# Patient Record
Sex: MALE | Race: BLACK OR AFRICAN AMERICAN | NOT HISPANIC OR LATINO | Employment: UNEMPLOYED | URBAN - METROPOLITAN AREA
[De-identification: names, ages, dates, MRNs, and addresses within clinical notes are randomized per-mention and may not be internally consistent; named-entity substitution may affect disease eponyms.]

---

## 2018-02-20 ENCOUNTER — HOSPITAL ENCOUNTER (OUTPATIENT)
Dept: GENERAL RADIOLOGY | Facility: CLINIC | Age: 68
Discharge: HOME | End: 2018-02-20
Attending: FAMILY MEDICINE | Admitting: FAMILY MEDICINE
Payer: MEDICAID

## 2018-02-20 DIAGNOSIS — R52 PAIN: ICD-10-CM

## 2018-02-20 PROCEDURE — 73110 X-RAY EXAM OF WRIST: CPT | Mod: LT

## 2021-02-22 ENCOUNTER — HOSPITAL ENCOUNTER (OUTPATIENT)
Dept: MRI IMAGING | Facility: CLINIC | Age: 71
End: 2021-02-22
Attending: INTERNAL MEDICINE
Payer: MEDICARE

## 2021-02-22 DIAGNOSIS — M25.562 ACUTE PAIN OF LEFT KNEE: ICD-10-CM

## 2021-02-22 DIAGNOSIS — M54.32 SCIATICA OF LEFT SIDE: ICD-10-CM

## 2021-02-22 PROCEDURE — 73721 MRI JNT OF LWR EXTRE W/O DYE: CPT | Mod: LT

## 2021-02-22 PROCEDURE — 73721 MRI JNT OF LWR EXTRE W/O DYE: CPT | Mod: LT,XS

## 2021-02-22 PROCEDURE — 73721 MRI JNT OF LWR EXTRE W/O DYE: CPT | Mod: 26 | Performed by: RADIOLOGY

## 2021-02-22 PROCEDURE — 72148 MRI LUMBAR SPINE W/O DYE: CPT | Mod: 26 | Performed by: RADIOLOGY

## 2021-02-22 PROCEDURE — 72148 MRI LUMBAR SPINE W/O DYE: CPT

## 2021-03-05 ENCOUNTER — TELEPHONE (OUTPATIENT)
Dept: NEUROSURGERY | Facility: CLINIC | Age: 71
End: 2021-03-05

## 2021-03-05 NOTE — TELEPHONE ENCOUNTER
Indira calling stating she was talking to a male scheduling an appointment for patient and got disconnected, Indira states it was for 4/7/21 but does not know the provider, no appointment has been made. Please call to discuss thank you.

## 2021-03-08 ENCOUNTER — APPOINTMENT (OUTPATIENT)
Dept: INTERPRETER SERVICES | Facility: CLINIC | Age: 71
End: 2021-03-08
Payer: MEDICARE

## 2021-03-08 NOTE — TELEPHONE ENCOUNTER
Writer spoke with pt who said that he has scheduled an appt in [facility] and no longer is needing to schedule with us.     Ashli Merrill

## 2021-04-07 ENCOUNTER — TELEPHONE (OUTPATIENT)
Dept: NEUROSURGERY | Facility: CLINIC | Age: 71
End: 2021-04-07

## 2021-04-07 NOTE — TELEPHONE ENCOUNTER
[facility] Call Center    Phone Message    May a detailed message be left on voicemail: yes     Reason for Call: Other: Patient needs to set up appointment with Neurosurgery. Primary care clinic called to state that patient made a mistake in stating appointment was not needed. please advise      Action Taken: Other: [facility]    Travel Screening: Not Applicable

## 2021-09-30 ENCOUNTER — LAB REQUISITION (OUTPATIENT)
Dept: LAB | Facility: CLINIC | Age: 71
End: 2021-09-30
Payer: MEDICARE

## 2021-09-30 DIAGNOSIS — R39.15 URGENCY OF URINATION: ICD-10-CM

## 2021-09-30 PROCEDURE — 87491 CHLMYD TRACH DNA AMP PROBE: CPT | Mod: ORL | Performed by: INTERNAL MEDICINE

## 2021-09-30 PROCEDURE — 87591 N.GONORRHOEAE DNA AMP PROB: CPT | Mod: ORL | Performed by: INTERNAL MEDICINE

## 2021-10-01 LAB
C TRACH DNA SPEC QL NAA+PROBE: NEGATIVE
N GONORRHOEA DNA SPEC QL NAA+PROBE: NEGATIVE

## 2021-11-08 ENCOUNTER — LAB REQUISITION (OUTPATIENT)
Dept: LAB | Facility: CLINIC | Age: 71
End: 2021-11-08
Payer: MEDICARE

## 2021-11-08 DIAGNOSIS — I10 ESSENTIAL (PRIMARY) HYPERTENSION: ICD-10-CM

## 2021-11-08 LAB
ANION GAP SERPL CALCULATED.3IONS-SCNC: 8 MMOL/L (ref 3–14)
BUN SERPL-MCNC: 17 MG/DL (ref 7–30)
CALCIUM SERPL-MCNC: 8.8 MG/DL (ref 8.5–10.1)
CHLORIDE BLD-SCNC: 104 MMOL/L (ref 94–109)
CO2 SERPL-SCNC: 24 MMOL/L (ref 20–32)
CREAT SERPL-MCNC: 1.03 MG/DL (ref 0.66–1.25)
GFR SERPL CREATININE-BSD FRML MDRD: 74 ML/MIN/1.73M2
GLUCOSE BLD-MCNC: 90 MG/DL (ref 70–99)
POTASSIUM BLD-SCNC: 3.6 MMOL/L (ref 3.4–5.3)
SODIUM SERPL-SCNC: 136 MMOL/L (ref 133–144)

## 2021-11-08 PROCEDURE — 80048 BASIC METABOLIC PNL TOTAL CA: CPT | Mod: ORL | Performed by: INTERNAL MEDICINE

## 2022-06-23 ENCOUNTER — TRANSCRIBE ORDERS (OUTPATIENT)
Dept: OTHER | Age: 72
End: 2022-06-23

## 2022-06-23 DIAGNOSIS — M17.0 OSTEOARTHRITIS OF BOTH KNEES, UNSPECIFIED OSTEOARTHRITIS TYPE: Primary | ICD-10-CM

## 2022-07-19 ENCOUNTER — TRANSCRIBE ORDERS (OUTPATIENT)
Dept: OTHER | Age: 72
End: 2022-07-19

## 2022-07-19 DIAGNOSIS — M54.32 SCIATICA OF LEFT SIDE: Primary | ICD-10-CM

## 2022-08-05 ENCOUNTER — THERAPY VISIT (OUTPATIENT)
Dept: PHYSICAL THERAPY | Facility: CLINIC | Age: 72
End: 2022-08-05
Attending: INTERNAL MEDICINE
Payer: MEDICARE

## 2022-08-05 DIAGNOSIS — M54.42 CHRONIC BILATERAL LOW BACK PAIN WITH BILATERAL SCIATICA: ICD-10-CM

## 2022-08-05 DIAGNOSIS — G89.29 CHRONIC BILATERAL LOW BACK PAIN WITH BILATERAL SCIATICA: ICD-10-CM

## 2022-08-05 DIAGNOSIS — M54.41 CHRONIC BILATERAL LOW BACK PAIN WITH BILATERAL SCIATICA: ICD-10-CM

## 2022-08-05 PROCEDURE — 97110 THERAPEUTIC EXERCISES: CPT | Mod: GP | Performed by: PHYSICAL THERAPIST

## 2022-08-05 PROCEDURE — 97161 PT EVAL LOW COMPLEX 20 MIN: CPT | Mod: GP | Performed by: PHYSICAL THERAPIST

## 2022-08-05 NOTE — PROGRESS NOTES
[facility]    OUTPATIENT Physical Therapy ORTHOPEDIC EVALUATION  PLAN OF TREATMENT FOR OUTPATIENT REHABILITATION  (COMPLETE FOR INITIAL CLAIMS ONLY)  Patient's Last Name, First Name, M.I.  YOB: 1950  Jossue Hickey  DOC    Provider s Name:  [facility]   Medical Record No.  [id number]   Start of Care Date:  08/05/22   Onset Date:       Type:     _X__PT   ___OT Medical Diagnosis:    Encounter Diagnosis   Name Primary?    Chronic bilateral low back pain with bilateral sciatica         Treatment Diagnosis:  low back and leg pain        Goals:     08/05/22 0500   Body Part   Goals listed below are for Back pain   Goal #1   Goal #1 lifting/carrying   Previous Functional Level No restrictions   Current Functional Level an item to counter height weighing   Performance level 5#, pain   STG Target Performance Lift an item to counter height weighing   Performance level 10#, pain 2/10 or less   Rationale for housework such as laundry, emptying garbage, use of    Due date 09/02/22   LTG Target Performance Lift an item to counter height weighing   Performance Level 10#, no pain   Rationale for housework such as laundry, emptying garbage, use of    Due date 09/30/22       Therapy Frequency:  1x/week  Predicted Duration of Therapy Intervention:  6-8 weeks    Florinda Thorpe, PT                 I CERTIFY THE NEED FOR THESE SERVICES FURNISHED UNDER        THIS PLAN OF TREATMENT AND WHILE UNDER MY CARE     (Physician attestation of this document indicates review and certification of the therapy plan).                     Certification Date From:  08/05/22   Certification Date To:  11/02/22    Referring Provider:  Taylor Willis    Initial Assessment        See Epic Evaluation SOC Date: 08/05/22

## 2022-08-05 NOTE — PROGRESS NOTES
Physical Therapy Initial Evaluation  Subjective:  Patient presents to outpatient PT with bilateral low back pain that can radiate down both legs (left side worse than right).  Symptoms have been going on for two months, and are not changing overall.  Sleep has been significantly disrupted, as well as prolonged sitting and walking.  Patient is currently using a cane, was not using one previously.    The history is provided by the patient. A  was used (in person, Cameroonian).   Therapist Generated HPI Evaluation         Type of problem:  Lumbar.    This is a chronic condition.  Condition occurred with:  Insidious onset.  Where condition occurred: for unknown reasons.  Patient reports pain:  Lumbar spine left and lumbar spine right.  and is constant.  Pain radiates to:  Thigh left, thigh right, lower leg left, lower leg right and foot left. Pain is the same all the time.  Since onset symptoms are unchanged.  Symptoms are exacerbated by walking, lifting and lying down  and relieved by nothing.          Patient Health History  Jossue Hickey being seen for low back pain with leg pain.       Problem occurred: no known cause          Red flags:  None as reported by patient.         Current medications:  None.                                           Objective:  System         Lumbar/SI Evaluation    Lumbar Myotomes:    T12-L3 (Hip Flex):  Left: 5    Right: 5  L2-4 (Quads):  Left:  5    Right:  5  L4 (Ankle DF):  Left:  5    Right:  5            Neural Tension/Mobility:    Left side:  Slump positive.     Right side:   Slump  negative.   Lumbar Palpation:  Palpation (lumbar): tenderness to palpation at mid to lower lumbar segments.  Tenderness present at Left:    Erector Spinae    Tenderness not present at Right:  Erector Spinae                                                       Shannon Lumbar Evaluation    Posture:  Sitting: fair        Correction of Posture: better    Movement Loss:  Flexion (Flex):  pain and mod  Extension (EXT): min and pain  Side [place] R (SG R): min  Side Glide L (SG L): min and pain  Test Movements:    EIS:   Repeat EIS: During: centralizing  After: no effect  Mechanical Response: IncROM          Static Tests:          Lying Prone in Extension: prone on elbows, centralized to low back, no better after    Conclusion: derangement  Principle of Treatment:      Extension: trial extension, monitor symptom response                                           ROS    Assessment/Plan:    Patient is a 72 year old male with lumbar complaints.    Patient has the following significant findings with corresponding treatment plan.                Diagnosis 1:  Bilateral low back and leg pain  Pain -  manual therapy, self management, education, directional preference exercise and home program  Decreased ROM/flexibility - manual therapy, therapeutic exercise and home program  Decreased strength - therapeutic exercise, therapeutic activities and home program  Impaired gait - gait training and home program  Impaired posture - neuro re-education and home program    Therapy Evaluation Codes:   1) History comprised of:   Personal factors that impact the plan of care:      None.    Comorbidity factors that impact the plan of care are:      None.     Medications impacting care: None.  2) Examination of Body Systems comprised of:   Body structures and functions that impact the plan of care:      Lumbar spine.   Activity limitations that impact the plan of care are:      Bending, Lifting, Standing, Walking and Sleeping.  3) Clinical presentation characteristics are:   Stable/Uncomplicated.  4) Decision-Making    Low complexity using standardized patient assessment instrument and/or measureable assessment of functional outcome.  Cumulative Therapy Evaluation is: Low complexity.    Previous and current functional limitations:  (See Goal Flow Sheet for this information)    Short term and Long term goals: (See Goal Flow  Sheet for this information)     Communication ability:  Patient appears to be able to clearly communicate and understand verbal and written communication and follow directions correctly.  Treatment Explanation - The following has been discussed with the patient:   RX ordered/plan of care  Anticipated outcomes  Possible risks and side effects  This patient would benefit from PT intervention to resume normal activities.   Rehab potential is good.    Frequency:  1 X week, once daily  Duration:  for 6-8 weeks  Discharge Plan:  Achieve all LTG.  Independent in home treatment program.  Reach maximal therapeutic benefit.    Please refer to the daily flowsheet for treatment today, total treatment time and time spent performing 1:1 timed codes.

## 2022-09-02 ENCOUNTER — ANCILLARY PROCEDURE (OUTPATIENT)
Dept: ULTRASOUND IMAGING | Facility: CLINIC | Age: 72
End: 2022-09-02
Attending: INTERNAL MEDICINE
Payer: MEDICARE

## 2022-09-02 DIAGNOSIS — N50.89 SWELLING OF RIGHT HALF OF SCROTUM: ICD-10-CM

## 2022-09-02 PROCEDURE — 93976 VASCULAR STUDY: CPT | Mod: GC | Performed by: RADIOLOGY

## 2022-09-02 PROCEDURE — 76870 US EXAM SCROTUM: CPT | Mod: GC | Performed by: RADIOLOGY

## 2022-09-22 ENCOUNTER — LAB REQUISITION (OUTPATIENT)
Dept: LAB | Facility: CLINIC | Age: 72
End: 2022-09-22
Payer: MEDICARE

## 2022-09-22 DIAGNOSIS — Z13.220 ENCOUNTER FOR SCREENING FOR LIPOID DISORDERS: ICD-10-CM

## 2022-09-22 DIAGNOSIS — R25.2 CRAMP AND SPASM: ICD-10-CM

## 2022-09-22 PROCEDURE — 80061 LIPID PANEL: CPT | Mod: ORL | Performed by: INTERNAL MEDICINE

## 2022-09-22 PROCEDURE — 83735 ASSAY OF MAGNESIUM: CPT | Mod: ORL | Performed by: INTERNAL MEDICINE

## 2022-09-22 PROCEDURE — 80048 BASIC METABOLIC PNL TOTAL CA: CPT | Mod: ORL | Performed by: INTERNAL MEDICINE

## 2022-09-23 LAB
ANION GAP SERPL CALCULATED.3IONS-SCNC: 15 MMOL/L (ref 7–15)
BUN SERPL-MCNC: 16.7 MG/DL (ref 8–23)
CALCIUM SERPL-MCNC: 9.4 MG/DL (ref 8.8–10.2)
CHLORIDE SERPL-SCNC: 98 MMOL/L (ref 98–107)
CHOLEST SERPL-MCNC: 145 MG/DL
CREAT SERPL-MCNC: 1.29 MG/DL (ref 0.67–1.17)
DEPRECATED HCO3 PLAS-SCNC: 24 MMOL/L (ref 22–29)
GFR SERPL CREATININE-BSD FRML MDRD: 60 ML/MIN/1.73M2
GLUCOSE SERPL-MCNC: 89 MG/DL (ref 70–99)
HDLC SERPL-MCNC: 55 MG/DL
LDLC SERPL CALC-MCNC: 59 MG/DL
MAGNESIUM SERPL-MCNC: 2.2 MG/DL (ref 1.7–2.3)
NONHDLC SERPL-MCNC: 90 MG/DL
POTASSIUM SERPL-SCNC: 3.6 MMOL/L (ref 3.4–5.3)
SODIUM SERPL-SCNC: 137 MMOL/L (ref 136–145)
TRIGL SERPL-MCNC: 155 MG/DL

## 2022-10-31 ENCOUNTER — TRANSCRIBE ORDERS (OUTPATIENT)
Dept: OTHER | Age: 72
End: 2022-10-31

## 2022-10-31 DIAGNOSIS — H91.93 BILATERAL HEARING LOSS, UNSPECIFIED HEARING LOSS TYPE: Primary | ICD-10-CM

## 2022-12-05 ENCOUNTER — MEDICAL CORRESPONDENCE (OUTPATIENT)
Dept: HEALTH INFORMATION MANAGEMENT | Facility: CLINIC | Age: 72
End: 2022-12-05

## 2022-12-05 ENCOUNTER — LAB REQUISITION (OUTPATIENT)
Dept: LAB | Facility: CLINIC | Age: 72
End: 2022-12-05
Payer: MEDICARE

## 2022-12-05 DIAGNOSIS — R79.89 OTHER SPECIFIED ABNORMAL FINDINGS OF BLOOD CHEMISTRY: ICD-10-CM

## 2022-12-05 LAB
ALBUMIN SERPL BCG-MCNC: 4.4 G/DL (ref 3.5–5.2)
ANION GAP SERPL CALCULATED.3IONS-SCNC: 16 MMOL/L (ref 7–15)
BUN SERPL-MCNC: 16.8 MG/DL (ref 8–23)
CALCIUM SERPL-MCNC: 9.1 MG/DL (ref 8.8–10.2)
CHLORIDE SERPL-SCNC: 97 MMOL/L (ref 98–107)
CREAT SERPL-MCNC: 0.99 MG/DL (ref 0.67–1.17)
DEPRECATED HCO3 PLAS-SCNC: 24 MMOL/L (ref 22–29)
GFR SERPL CREATININE-BSD FRML MDRD: 82 ML/MIN/1.73M2
GLUCOSE SERPL-MCNC: 93 MG/DL (ref 70–99)
PHOSPHATE SERPL-MCNC: 2.8 MG/DL (ref 2.5–4.5)
POTASSIUM SERPL-SCNC: 3 MMOL/L (ref 3.4–5.3)
SODIUM SERPL-SCNC: 137 MMOL/L (ref 136–145)

## 2022-12-05 PROCEDURE — 80069 RENAL FUNCTION PANEL: CPT | Mod: ORL | Performed by: INTERNAL MEDICINE

## 2022-12-06 ENCOUNTER — LAB REQUISITION (OUTPATIENT)
Dept: LAB | Facility: CLINIC | Age: 72
End: 2022-12-06
Payer: MEDICARE

## 2022-12-06 DIAGNOSIS — K21.9 GASTRO-ESOPHAGEAL REFLUX DISEASE WITHOUT ESOPHAGITIS: ICD-10-CM

## 2022-12-06 PROCEDURE — 87338 HPYLORI STOOL AG IA: CPT | Mod: ORL | Performed by: INTERNAL MEDICINE

## 2022-12-07 ENCOUNTER — TRANSCRIBE ORDERS (OUTPATIENT)
Dept: OTHER | Age: 72
End: 2022-12-07

## 2022-12-07 DIAGNOSIS — N50.811 TESTICULAR PAIN, RIGHT: Primary | ICD-10-CM

## 2022-12-07 LAB — H PYLORI AG STL QL IA: POSITIVE

## 2022-12-12 ENCOUNTER — OFFICE VISIT (OUTPATIENT)
Dept: UROLOGY | Facility: CLINIC | Age: 72
End: 2022-12-12
Attending: INTERNAL MEDICINE
Payer: MEDICARE

## 2022-12-12 VITALS — HEART RATE: 86 BPM | OXYGEN SATURATION: 98 % | SYSTOLIC BLOOD PRESSURE: 117 MMHG | DIASTOLIC BLOOD PRESSURE: 64 MMHG

## 2022-12-12 DIAGNOSIS — N50.811 TESTICULAR PAIN, RIGHT: ICD-10-CM

## 2022-12-12 PROCEDURE — 99204 OFFICE O/P NEW MOD 45 MIN: CPT | Performed by: UROLOGY

## 2022-12-12 RX ORDER — GABAPENTIN 100 MG/1
200 CAPSULE ORAL
COMMUNITY
Start: 2022-09-22

## 2022-12-12 RX ORDER — PSEUDOEPHED/ACETAMINOPH/DIPHEN 30MG-500MG
TABLET ORAL
COMMUNITY
Start: 2022-08-15

## 2022-12-12 RX ORDER — ALLOPURINOL 100 MG/1
TABLET ORAL
COMMUNITY
Start: 2022-12-09

## 2022-12-12 RX ORDER — MULTIPLE VITAMINS W/ MINERALS TAB 9MG-400MCG
1 TAB ORAL DAILY
COMMUNITY
Start: 2022-12-05

## 2022-12-12 RX ORDER — LORATADINE 10 MG/1
10 TABLET ORAL
COMMUNITY
Start: 2022-01-10

## 2022-12-12 RX ORDER — HYDROCHLOROTHIAZIDE 25 MG/1
TABLET ORAL
COMMUNITY
Start: 2022-09-22

## 2022-12-12 RX ORDER — ATORVASTATIN CALCIUM 80 MG/1
80 TABLET, FILM COATED ORAL DAILY
COMMUNITY
Start: 2022-09-23

## 2022-12-12 RX ORDER — FAMOTIDINE 20 MG/1
20 TABLET, FILM COATED ORAL
COMMUNITY
Start: 2022-12-05

## 2022-12-12 NOTE — PATIENT INSTRUCTIONS
Please call [facility] @ [phone] to schedule a scrotal ultrasound.   We will follow up with you to discuss the results.     If needed, contact your insurance company to make sure the scan is covered under your insurance plan.

## 2022-12-12 NOTE — PROGRESS NOTES
S: Patient is a 72-year-old non-English speaking [employer] [employer] man who was requested to be seen by Dr. Willis for a consultation with regard to patient's right chronic testicular pain.  Patient had injury to his testicle when he was 13 years old.  Since then patient complains of intermittent problems with right testicular pain.  Lately pain has been more constant with more irritation.  He has no obvious aggravating or relieving factors.  He has been taking antibiotics intermittently.  Patient had a scrotal ultrasound on September of this year that showed possible right epididymitis along with small reactive hydrocele.  Information was obtained through [facility] .  Current Outpatient Medications   Medication Sig Dispense Refill     diclofenac (VOLTAREN) 1 % topical gel Apply 2 g topically       famotidine (PEPCID) 20 MG tablet Take 20 mg by mouth       gabapentin (NEURONTIN) 100 MG capsule Take 200 mg by mouth       loratadine (CLARITIN) 10 MG tablet Take 10 mg by mouth       multivitamin w/minerals (MULTI-VITAMIN) tablet Take 1 tablet by mouth daily       ACETAMINOPHEN EXTRA STRENGTH 500 MG tablet TAKE 2 TABLETS BY MOUTH EVERY 6 HOURS AS NEEDED FOR PAIN.       allopurinol (ZYLOPRIM) 100 MG tablet        atorvastatin (LIPITOR) 80 MG tablet Take 80 mg by mouth daily       hydrochlorothiazide (HYDRODIURIL) 25 MG tablet TAKE 1 TABLET BY MOUTH ONCE DAILY FOR HIGH BLOOD PRESSURE       omeprazole (PRILOSEC) 20 MG DR capsule Take 20 mg by mouth daily       No Known Allergies  No past medical history on file.  No past surgical history on file.   No family history on file.  Social History     Socioeconomic History     Marital status: Single   Tobacco Use     Smoking status: Never       REVIEW OF SYSTEMS  =================  C: NEGATIVE for fever, chills, change in weight  I: NEGATIVE for worrisome rashes, moles or lesions  E/M: NEGATIVE for ear, mouth and throat problems  R: NEGATIVE for significant cough or SHORTNESS OF  BREATH  CV:  NEGATIVE for chest pain, palpitations or peripheral edema  GI: NEGATIVE for nausea, abdominal pain, heartburn, or change in bowel habits  NEURO: NEGATIVE numbness/weakness  : see HPI  PSYCH: NEGATIVE depression/anxiety  LYmph: no new enlarged lymph nodes  Ortho: no new trauma/movements      Physical Exam:  /64 (BP Location: Left arm, Patient Position: Chair, Cuff Size: Adult Large)   Pulse 86   SpO2 98%    Patient is pleasant, in no acute distress, good general condition.  Heart:  negative, PMI normal  Lung: no evidence of respiratory distress    Abdomen: Soft, nondistended, non tender. No masses. No rebound or guarding.   Exam: Penis no discharge.  Testes no masses.  Right epididymis feels firmer than right but nontender.  No scrotal edema or cellulitis.  Skin: Warm and dry.  No redness.  Neuro: grossly normal  Musculaskeletal: moving all extremities  Psych normal mood and affect  Musculoskeletal  moving all extremities  Hematologic/Lymphatic/Immunologic: normal ant/post cervical, axillary, supraclavicular and inguinal nodes    Assessment/Plan:     72-year-old Somalian male with chronic right testicular pain.  It is somewhat difficult getting history due to language barrier.  I will schedule patient for repeat scrotal sono for further evaluation.

## 2022-12-13 ENCOUNTER — HOSPITAL ENCOUNTER (OUTPATIENT)
Dept: ULTRASOUND IMAGING | Facility: CLINIC | Age: 72
Discharge: HOME | End: 2022-12-13
Attending: UROLOGY | Admitting: UROLOGY
Payer: MEDICARE

## 2022-12-13 DIAGNOSIS — N50.811 TESTICULAR PAIN, RIGHT: ICD-10-CM

## 2022-12-13 PROCEDURE — 76870 US EXAM SCROTUM: CPT

## 2022-12-13 PROCEDURE — 76870 US EXAM SCROTUM: CPT | Mod: 26 | Performed by: RADIOLOGY

## 2022-12-13 PROCEDURE — 93976 VASCULAR STUDY: CPT | Mod: 26 | Performed by: RADIOLOGY

## 2022-12-15 ENCOUNTER — LAB REQUISITION (OUTPATIENT)
Dept: LAB | Facility: CLINIC | Age: 72
End: 2022-12-15
Payer: MEDICARE

## 2022-12-15 DIAGNOSIS — Z12.11 ENCOUNTER FOR SCREENING FOR MALIGNANT NEOPLASM OF COLON: ICD-10-CM

## 2022-12-15 LAB — HEMOCCULT STL QL IA: NEGATIVE

## 2022-12-15 PROCEDURE — 82274 ASSAY TEST FOR BLOOD FECAL: CPT | Mod: ORL | Performed by: INTERNAL MEDICINE

## 2023-01-23 ENCOUNTER — TRANSCRIBE ORDERS (OUTPATIENT)
Dept: OTHER | Age: 73
End: 2023-01-23

## 2023-01-23 DIAGNOSIS — H53.8 BLURRED VISION: Primary | ICD-10-CM

## 2023-01-23 NOTE — PROGRESS NOTES
HPI:  Patient is referred for blurry vision evaluation. Patient complains of difficulty reading in both eyes.     Phone       Pertinent Medical History:    Low back pain    Hypertension    Hyperlipidemia    Headache    Hearing loss, bilateral    Osteoarthritis, both knees    Ocular History:    None    Eye Medications:    None    Assessment and Plan:  1.   Cataracts, both eyes.     Macular OCT 01/30/2023: Both eyes: normal    Visually significant. Patient is not interested in cataract surgery at this juncture. He understands that he may not see very well with glasses but would like to try glasses first.     Cataract follow up in 6 months with Leda Griffin/Deedee/Chaitanya.     2.   Myopia/Presbyopia, both eyes.     Patient is not interested in cataract surgery at this juncture. He understands that he may not see very well with glasses but would like to try glasses first.     Glasses prescription given.         Patient consented to a dilated eye exam:    Yes. Side effects discussed.    Medical History:  No past medical history on file.    Medications:  Current Outpatient Medications   Medication Sig Dispense Refill     ACETAMINOPHEN EXTRA STRENGTH 500 MG tablet TAKE 2 TABLETS BY MOUTH EVERY 6 HOURS AS NEEDED FOR PAIN.       allopurinol (ZYLOPRIM) 100 MG tablet        atorvastatin (LIPITOR) 80 MG tablet Take 80 mg by mouth daily       diclofenac (VOLTAREN) 1 % topical gel Apply 2 g topically       famotidine (PEPCID) 20 MG tablet Take 20 mg by mouth       gabapentin (NEURONTIN) 100 MG capsule Take 200 mg by mouth       hydrochlorothiazide (HYDRODIURIL) 25 MG tablet TAKE 1 TABLET BY MOUTH ONCE DAILY FOR HIGH BLOOD PRESSURE       loratadine (CLARITIN) 10 MG tablet Take 10 mg by mouth       multivitamin w/minerals (MULTI-VITAMIN) tablet Take 1 tablet by mouth daily       omeprazole (PRILOSEC) 20 MG DR capsule Take 20 mg by mouth daily     Complete documentation of historical and exam elements from today's  encounter can be found in the full encounter summary report (not reduplicated in this progress note). I personally obtained the chief complaint(s) and history of present illness.  I confirmed and edited as necessary the review of systems, past medical/surgical history, family history, social history, and examination findings as documented by others; and I examined the patient myself. I personally reviewed the relevant tests, images, and reports as documented above. I formulated and edited as necessary the assessment and plan and discussed the findings and management plan with the patient and family. - Latrice España, BASIA

## 2023-01-30 ENCOUNTER — OFFICE VISIT (OUTPATIENT)
Dept: OPHTHALMOLOGY | Facility: CLINIC | Age: 73
End: 2023-01-30
Attending: OPTOMETRIST
Payer: COMMERCIAL

## 2023-01-30 DIAGNOSIS — H52.13 MYOPIA OF BOTH EYES: ICD-10-CM

## 2023-01-30 DIAGNOSIS — H52.4 PRESBYOPIA OF BOTH EYES: ICD-10-CM

## 2023-01-30 DIAGNOSIS — H25.813 COMBINED FORM OF SENILE CATARACT OF BOTH EYES: Primary | ICD-10-CM

## 2023-01-30 PROCEDURE — G0463 HOSPITAL OUTPT CLINIC VISIT: HCPCS | Mod: 25

## 2023-01-30 PROCEDURE — 92015 DETERMINE REFRACTIVE STATE: CPT

## 2023-01-30 PROCEDURE — 92004 COMPRE OPH EXAM NEW PT 1/>: CPT | Performed by: OPTOMETRIST

## 2023-01-30 ASSESSMENT — VISUAL ACUITY
OD_PH_CC: 20/30
OS_PH_CC: 20/25
OD_CC: 20/250
CORRECTION_TYPE: GLASSES
METHOD: SNELLEN - LINEAR
OS_CC: 20/60

## 2023-01-30 ASSESSMENT — REFRACTION_MANIFEST
OS_ADD: +2.50
OS_CYLINDER: +0.75
OD_AXIS: 170
OS_AXIS: 010
OS_SPHERE: -1.75
OD_CYLINDER: +0.50
OD_SPHERE: -1.75
OD_ADD: +2.50

## 2023-01-30 ASSESSMENT — CONF VISUAL FIELD
OS_SUPERIOR_NASAL_RESTRICTION: 0
OD_INFERIOR_NASAL_RESTRICTION: 0
OD_INFERIOR_TEMPORAL_RESTRICTION: 0
OS_INFERIOR_NASAL_RESTRICTION: 0
OD_SUPERIOR_TEMPORAL_RESTRICTION: 0
OD_SUPERIOR_NASAL_RESTRICTION: 0
OS_INFERIOR_TEMPORAL_RESTRICTION: 0
OS_SUPERIOR_TEMPORAL_RESTRICTION: 0
METHOD: COUNTING FINGERS
OD_NORMAL: 1
OS_NORMAL: 1

## 2023-01-30 ASSESSMENT — EXTERNAL EXAM - LEFT EYE: OS_EXAM: NORMAL

## 2023-01-30 ASSESSMENT — EXTERNAL EXAM - RIGHT EYE: OD_EXAM: NORMAL

## 2023-01-30 ASSESSMENT — REFRACTION_WEARINGRX
OD_SPHERE: PLANO
SPECS_TYPE: BIFOCAL
OD_CYLINDER: +0.75
OD_ADD: +2.75
OS_CYLINDER: +0.75
OD_AXIS: 002
OS_ADD: +2.75
OS_AXIS: 003
OS_SPHERE: PLANO

## 2023-01-30 ASSESSMENT — TONOMETRY
OD_IOP_MMHG: 15
IOP_METHOD: TONOPEN
OS_IOP_MMHG: 14

## 2023-01-30 ASSESSMENT — SLIT LAMP EXAM - LIDS
COMMENTS: NORMAL
COMMENTS: NORMAL

## 2023-01-30 NOTE — NURSING NOTE
Chief Complaints and History of Present Illnesses   Patient presents with     Annual Eye Exam     Chief Complaint(s) and History of Present Illness(es)     Annual Eye Exam           Comments    Pt here with  over the phone today.  Pt here for yearly eye exam. Pt having difficulty reading up close with current glasses. Current glasses are 2-3 years old.  No eye pain today. No redness or dryness.  No DM.    JULIETTE Poole January 30, 2023 9:41 AM

## 2023-07-20 ENCOUNTER — LAB REQUISITION (OUTPATIENT)
Dept: LAB | Facility: CLINIC | Age: 73
End: 2023-07-20
Payer: COMMERCIAL

## 2023-07-20 ENCOUNTER — MEDICAL CORRESPONDENCE (OUTPATIENT)
Dept: HEALTH INFORMATION MANAGEMENT | Facility: CLINIC | Age: 73
End: 2023-07-20

## 2023-07-20 DIAGNOSIS — E87.6 HYPOKALEMIA: ICD-10-CM

## 2023-07-20 PROCEDURE — 80048 BASIC METABOLIC PNL TOTAL CA: CPT | Mod: ORL | Performed by: INTERNAL MEDICINE

## 2023-07-21 LAB
ANION GAP SERPL CALCULATED.3IONS-SCNC: 12 MMOL/L (ref 7–15)
BUN SERPL-MCNC: 13.4 MG/DL (ref 8–23)
CALCIUM SERPL-MCNC: 9 MG/DL (ref 8.8–10.2)
CHLORIDE SERPL-SCNC: 100 MMOL/L (ref 98–107)
CREAT SERPL-MCNC: 1.01 MG/DL (ref 0.67–1.17)
DEPRECATED HCO3 PLAS-SCNC: 25 MMOL/L (ref 22–29)
GFR SERPL CREATININE-BSD FRML MDRD: 80 ML/MIN/1.73M2
GLUCOSE SERPL-MCNC: 86 MG/DL (ref 70–99)
POTASSIUM SERPL-SCNC: 3.2 MMOL/L (ref 3.4–5.3)
SODIUM SERPL-SCNC: 137 MMOL/L (ref 136–145)

## 2023-07-24 ENCOUNTER — TRANSCRIBE ORDERS (OUTPATIENT)
Dept: OTHER | Age: 73
End: 2023-07-24

## 2023-07-24 DIAGNOSIS — H90.3 BILATERAL SENSORINEURAL HEARING LOSS: Primary | ICD-10-CM

## 2023-07-24 DIAGNOSIS — H26.9 CATARACT OF BOTH EYES: Primary | ICD-10-CM

## 2023-08-02 ENCOUNTER — TELEPHONE (OUTPATIENT)
Dept: AUDIOLOGY | Facility: CLINIC | Age: 73
End: 2023-08-02
Payer: COMMERCIAL

## 2023-08-07 ENCOUNTER — OFFICE VISIT (OUTPATIENT)
Dept: OPHTHALMOLOGY | Facility: CLINIC | Age: 73
End: 2023-08-07
Attending: INTERNAL MEDICINE
Payer: COMMERCIAL

## 2023-08-07 DIAGNOSIS — H25.813 COMBINED FORM OF AGE-RELATED CATARACT, BOTH EYES: Primary | ICD-10-CM

## 2023-08-07 DIAGNOSIS — H52.7 REFRACTIVE ERROR: ICD-10-CM

## 2023-08-07 PROCEDURE — 76519 ECHO EXAM OF EYE: CPT | Performed by: STUDENT IN AN ORGANIZED HEALTH CARE EDUCATION/TRAINING PROGRAM

## 2023-08-07 PROCEDURE — G0463 HOSPITAL OUTPT CLINIC VISIT: HCPCS | Performed by: STUDENT IN AN ORGANIZED HEALTH CARE EDUCATION/TRAINING PROGRAM

## 2023-08-07 PROCEDURE — 92134 CPTRZ OPH DX IMG PST SGM RTA: CPT | Performed by: STUDENT IN AN ORGANIZED HEALTH CARE EDUCATION/TRAINING PROGRAM

## 2023-08-07 PROCEDURE — 92025 CPTRIZED CORNEAL TOPOGRAPHY: CPT | Performed by: STUDENT IN AN ORGANIZED HEALTH CARE EDUCATION/TRAINING PROGRAM

## 2023-08-07 PROCEDURE — 92134 CPTRZ OPH DX IMG PST SGM RTA: CPT | Mod: 26 | Performed by: STUDENT IN AN ORGANIZED HEALTH CARE EDUCATION/TRAINING PROGRAM

## 2023-08-07 PROCEDURE — 92004 COMPRE OPH EXAM NEW PT 1/>: CPT | Performed by: STUDENT IN AN ORGANIZED HEALTH CARE EDUCATION/TRAINING PROGRAM

## 2023-08-07 ASSESSMENT — VISUAL ACUITY
CORRECTION_TYPE: GLASSES
METHOD: SNELLEN - LINEAR
OS_CC: 20/40
OS_BAT_HIGH: <20/400
OD_BAT_HIGH: <20/400
OD_CC: 20/200
OS_CC+: -1

## 2023-08-07 ASSESSMENT — CONF VISUAL FIELD
OD_NORMAL: 1
METHOD: COUNTING FINGERS
OD_SUPERIOR_NASAL_RESTRICTION: 0
OS_SUPERIOR_TEMPORAL_RESTRICTION: 0
OS_SUPERIOR_NASAL_RESTRICTION: 0
OS_NORMAL: 1
OD_SUPERIOR_TEMPORAL_RESTRICTION: 0
OD_INFERIOR_NASAL_RESTRICTION: 0
OD_INFERIOR_TEMPORAL_RESTRICTION: 0
OS_INFERIOR_TEMPORAL_RESTRICTION: 0
OS_INFERIOR_NASAL_RESTRICTION: 0

## 2023-08-07 ASSESSMENT — REFRACTION_MANIFEST
OD_SPHERE: -0.25
OS_AXIS: 010
OS_ADD: +2.50
OS_SPHERE: -1.25
OD_AXIS: 170
OS_CYLINDER: +0.75
OD_CYLINDER: +0.50
OD_ADD: +2.50

## 2023-08-07 ASSESSMENT — SLIT LAMP EXAM - LIDS
COMMENTS: NORMAL
COMMENTS: NORMAL

## 2023-08-07 ASSESSMENT — REFRACTION_WEARINGRX
OD_AXIS: 170
OS_CYLINDER: +0.75
OS_ADD: +2.50
OD_ADD: +2.50
OS_AXIS: 010
OD_CYLINDER: +0.50
OD_SPHERE: -1.75
OS_SPHERE: -1.75

## 2023-08-07 ASSESSMENT — TONOMETRY
IOP_METHOD: TONOPEN
OS_IOP_MMHG: 15
OD_IOP_MMHG: 14

## 2023-08-07 ASSESSMENT — EXTERNAL EXAM - LEFT EYE: OS_EXAM: NORMAL

## 2023-08-07 ASSESSMENT — EXTERNAL EXAM - RIGHT EYE: OD_EXAM: NORMAL

## 2023-08-07 NOTE — NURSING NOTE
Chief Complaints and History of Present Illnesses   Patient presents with    Cataract Evaluation     Chief Complaint(s) and History of Present Illness(es)       Cataract Evaluation              Laterality: both eyes    Associated symptoms: blurred vision, glare and a need for brighter lights    Onset: gradual              Comments    Here for cataracts evaluation. Visoin has been fluctuating. Some eye pain - worse when open eyes wide. No flashes or floaters. No gtts.    Patricio Cornelius COT 2:10 PM August 7, 2023

## 2023-08-07 NOTE — PROGRESS NOTES
HPI       Cataract Evaluation    In both eyes.  Associated symptoms include blurred vision, glare and a need for brighter lights.  Onset was gradual.             Comments    Here for cataracts evaluation. Visoin has been fluctuating. Some eye pain - worse when open eyes wide. No flashes or floaters. No gtts.    Patricio Cornelius COT 2:10 PM August 7, 2023             Last edited by Patricio Cornelius on 8/7/2023  2:11 PM.          Review of systems for the eyes was negative other than the pertinent positives/negatives listed in the HPI.    Ocular Meds: none    Ocular Hx: cataracts OU; refractive error OU    FOHx: no family history of glaucoma or blindness    PMHx:     Past Medical History:   Diagnosis Date    Hypercholesterolemia     Hypertension        Assessment & Plan      Jossue Hickey is a 73 year old male with the following diagnoses: family here to help interpret    1. Combined form of age-related cataract, both eyes    2. Refractive error       Combined form of age-related cataract OU  - visually significant and affecting ADLs,  - patient is unsure which eye is dominant  - various lens options discussed with patient including premium, toric, and monofocal lenses; patient would like monofocal lenses aimed at plano and understands the possible need for glasses for distance and near vision  - r/b/a of cataract extraction with intraocular lens insertion including blindness, decreased vision, damage to surrounding structures, bleeding, infection, risk of anesthesia, and need for additional surgeries d/w pt, pt expressed understanding and would like to proceed; and informed consent was obtained.  - preop/postop drops to be prescribed: vigamox/predforte/ketorolac QID each to procedure eye, to start day of surgery  - patient will see PCP for surgical clearance  - patient to schedule POD#1, POW#1, POM#1 appt; will scheduled right eye first then left eye    Special equipment/needs:  Eye: right  Anesthesia:MAC with topical  Dilates to:  6.5 mm  Iris expansion:  no  Trypan Blue: Yes    Able lay to flat: Yes  Blood Thinner: No   Tamsulosin: No  DM: No  Fuch's/Guttae/PXF: No , though does have diffuse endopigment  LASIK/PRK/SMILE/Eye Surgery/Intravitreal injection: No   Trauma: No     Refractive error  - will hold on refraction until after cataract surgery    Counseled return/RD precautions    Patient disposition:   Return for Follow Up for post op cataract surgery visit, or sooner changes.      Attending Physician Attestation:  Complete documentation of historical and exam elements from today's encounter can be found in the full encounter summary report (not reduplicated in this progress note).  I personally obtained the chief complaint(s) and history of present illness.  I confirmed and edited as necessary the review of systems, past medical/surgical history, family history, social history, and examination findings as documented by others; and I examined the patient myself.  I personally reviewed the relevant tests, images, and reports as documented above.  I formulated and edited as necessary the assessment and plan and discussed the findings and management plan with the patient and family. Today with Jossue Hickey, I reviewed the indications, risks, benefits, and alternatives of the proposed surgical procedure including, but not limited to, failure obtain the desired result  and need for additional surgery, bleeding, infection, loss of vision, loss of the eye, and the remote possibility of permanent damage to any organ system or death with the use of anesthesia.  I provided multiple opportunities for the questions, answered all questions to the best of my ability, and confirmed that my answers and my discussion were understood. -Kimmy Griffin MD

## 2023-08-09 ENCOUNTER — TELEPHONE (OUTPATIENT)
Dept: OPHTHALMOLOGY | Facility: CLINIC | Age: 73
End: 2023-08-09
Payer: COMMERCIAL

## 2023-08-09 PROBLEM — H25.813 COMBINED FORM OF AGE-RELATED CATARACT, BOTH EYES: Status: ACTIVE | Noted: 2023-08-07

## 2023-08-09 NOTE — TELEPHONE ENCOUNTER
Called patient to schedule surgery with Dr. Griffin    Date of Surgery: 10/26,11/9    Location of surgery: [facility]    Pre-Op H&P: PCP    Pre/Post Imaging:  No    Post-Op Appt Date: 10/27,11/3,11/10,11/24,12/8    Surgery Packet Mailed: yes    Additional comments: Spoke with patient he is aware of above dates, will review packet and reach out with any questions           Anna C. Schoenecker on 8/9/2023 at 2:26 PM

## 2023-09-01 ENCOUNTER — OFFICE VISIT (OUTPATIENT)
Dept: UROLOGY | Facility: CLINIC | Age: 73
End: 2023-09-01
Payer: COMMERCIAL

## 2023-09-01 VITALS — OXYGEN SATURATION: 99 % | HEART RATE: 84 BPM | DIASTOLIC BLOOD PRESSURE: 85 MMHG | SYSTOLIC BLOOD PRESSURE: 127 MMHG

## 2023-09-01 DIAGNOSIS — N50.811 TESTICULAR PAIN, RIGHT: Primary | ICD-10-CM

## 2023-09-01 PROCEDURE — 99213 OFFICE O/P EST LOW 20 MIN: CPT | Performed by: UROLOGY

## 2023-09-01 RX ORDER — CEPHALEXIN 500 MG/1
500 CAPSULE ORAL 3 TIMES DAILY
Qty: 30 CAPSULE | Refills: 0 | Status: SHIPPED | OUTPATIENT
Start: 2023-09-01 | End: 2023-10-26

## 2023-09-01 NOTE — PROGRESS NOTES
Chief Complaint   Patient presents with    Consult       Jossue DOC Hickey is a 73 year old male who presents today for follow up of   Chief Complaint   Patient presents with    Consult   Patient is a 73-year-old male who is seen for a consultation with regard to patient's right testicular pain.  Patient reporting having trauma to his scrotal area when his was 14 and since then he has had intermittent pain from it.  Patient had an ultrasound done previously that showed evidence of right epididymitis.  Findings favor a postinfectious or traumatic etiology.  He complains of pain since this morning.  He has no urinary problems.  Info obtained through [employer] .    Current Outpatient Medications   Medication Sig Dispense Refill    ACETAMINOPHEN EXTRA STRENGTH 500 MG tablet TAKE 2 TABLETS BY MOUTH EVERY 6 HOURS AS NEEDED FOR PAIN.      allopurinol (ZYLOPRIM) 100 MG tablet       atorvastatin (LIPITOR) 80 MG tablet Take 80 mg by mouth daily      cephALEXin (KEFLEX) 500 MG capsule Take 1 capsule (500 mg) by mouth 3 times daily 30 capsule 0    diclofenac (VOLTAREN) 1 % topical gel Apply 2 g topically      famotidine (PEPCID) 20 MG tablet Take 20 mg by mouth      gabapentin (NEURONTIN) 100 MG capsule Take 200 mg by mouth      hydrochlorothiazide (HYDRODIURIL) 25 MG tablet TAKE 1 TABLET BY MOUTH ONCE DAILY FOR HIGH BLOOD PRESSURE      loratadine (CLARITIN) 10 MG tablet Take 10 mg by mouth      multivitamin w/minerals (MULTI-VITAMIN) tablet Take 1 tablet by mouth daily      omeprazole (PRILOSEC) 20 MG DR capsule Take 20 mg by mouth daily       No Known Allergies   Past Medical History:   Diagnosis Date    Hypercholesterolemia     Hypertension      No past surgical history on file.  Family History   Problem Relation Age of Onset    Glaucoma No family hx of     Macular Degeneration No family hx of      Social History     Socioeconomic History    Marital status: Single     Spouse name: None    Number of children: None     Years of education: None    Highest education level: None   Tobacco Use    Smoking status: Never    Smokeless tobacco: Never       REVIEW OF SYSTEMS  =================  C: NEGATIVE for fever, chills, change in weight  I: NEGATIVE for worrisome rashes, moles or lesions  E/M: NEGATIVE for ear, mouth and throat problems  R: NEGATIVE for significant cough or SHORTNESS OF BREATH  CV:  NEGATIVE for chest pain, palpitations or peripheral edema  GI: NEGATIVE for nausea, abdominal pain, heartburn, or change in bowel habits  NEURO: NEGATIVE numbness/weakness  : see HPI  PSYCH: NEGATIVE depression/anxiety  LYmph: no new enlarged lymph nodes  Ortho: no new trauma/movements    Physical Exam:  /85 (BP Location: Left arm, Patient Position: Chair, Cuff Size: Adult Large)   Pulse 84   SpO2 99%    Patient is pleasant, in no acute distress, good general condition.  Lung: no evidence of respiratory distress    Abdomen: Soft, nondistended, non tender. No masses. No rebound or guarding.   Exam: Penis no discharge.  Testes no masses.  Right epididymis tender to touch but no obvious swelling or inflammation.  No scrotal skin lesion.  Skin: Warm and dry.  No redness.  Psych: normal mood and affect  Neuro: alert and oriented  Musculaskeletal: moving all extremities    Assessment/Plan:   (N50.811) Testicular pain, right  (primary encounter diagnosis)  Comment: Possible epididymitis  Plan: cephALEXin (KEFLEX) 500 MG capsule        P.o. 3 times daily for 10 days.

## 2023-10-06 ENCOUNTER — OFFICE VISIT (OUTPATIENT)
Dept: AUDIOLOGY | Facility: CLINIC | Age: 73
End: 2023-10-06
Attending: INTERNAL MEDICINE
Payer: COMMERCIAL

## 2023-10-06 DIAGNOSIS — H90.3 BILATERAL SENSORINEURAL HEARING LOSS: ICD-10-CM

## 2023-10-06 PROCEDURE — 92557 COMPREHENSIVE HEARING TEST: CPT | Performed by: AUDIOLOGIST

## 2023-10-06 PROCEDURE — 92565 STENGER TEST PURE TONE: CPT | Performed by: AUDIOLOGIST

## 2023-10-06 PROCEDURE — 92567 TYMPANOMETRY: CPT | Performed by: AUDIOLOGIST

## 2023-10-06 NOTE — PROGRESS NOTES
AUDIOLOGY REPORT    SUBJECTIVE:  Jossue Hickey is a 73 year old male who was seen in the Audiology Clinic at the [facility] for audiologic evaluation, referred by Taylor Willis M.D.. The patient reports difficulty hearing in both ear and notes that he needs more volume to hear. He reports bilateral intermittent tinnitus and a history of occupational and  noise exposure. The patient denies  bilateral otalgia, bilateral drainage, bilateral aural fullness, and dizziness. No history of ear surgeries were reported.  The patient notes difficulty with communication in a variety of listening situations.  They were accompanied today by their daughter and a Kuwaiti . Yes: Language: Kuwaiti, ID Number/Identifier: [id number] .    OBJECTIVE:  Fall Risk Screen:  1. Have you fallen two or more times in the past year? No  2. Have you fallen and had an injury in the past year? No    Timed Up and Go Score (in seconds): not tested  Is patient a fall risk? No  Referral initiated: No  Fall Risk Assessment Completed by Audiology    Otoscopic exam indicates ears are clear of cerumen bilaterally     Pure Tone Thresholds assessed using conventional audiometry with good  reliability from 250-8000 Hz bilaterally using insert earphones and circumaural headphones     RIGHT:  borderline-normal sloping to moderate-severe sensorineural hearing loss    LEFT:    mild sloping to moderate-severe sensorineural hearing loss  Ear difference 3-6 kHz, Negative deepak.    Tympanogram:    RIGHT: normal eardrum mobility    LEFT:   normal eardrum mobility    Reflexes (reported by stimulus ear):  Could not maintain seal    Speech Detection Threshold:    RIGHT: 35 dB HL    LEFT:   25 dB HL    Word Recognition Score:     RIGHT: 100% at 80 dB HL via Kuwaiti  monitored live voice    LEFT:   92% at 80 dB HL via [place]i  monitored live voice      ASSESSMENT:     ICD-10-CM    1.  Bilateral sensorineural hearing loss  H90.3 Adult Audiology  Referral           Today's results revealed a bilateral asymmetric sensorineural hearing loss. Today s results were discussed with the patient in detail.     PLAN:  Patient was counseled regarding hearing loss and impact on communication. Patient is a good candidate for amplification at this time. A trial with amplification was discussed. They have a potential discount through [employer], it was reviewed that an insurance benefit check indicated that they would be self-pay for the hearing aids at this clinic. His daughter indicated that they would like to investigate insurance options from a financial standpoint and so no hearing aid consultation was performed today. They were provided a copy of their hearing evaluation should they go elsewhere. It is recommended that the patient monitor his hearing status in one year, sooner if concerns. Given the significant ear difference a follow-up with ENT or their referring physician is recommended. Patient and his family member expressed understanding regarding next steps for amplification. Please call this clinic with questions regarding these results or recommendations.        Kalie Cadet  Audiologist  [place] License  #[id number]

## 2023-10-23 ENCOUNTER — ANESTHESIA EVENT (OUTPATIENT)
Dept: SURGERY | Facility: AMBULATORY SURGERY CENTER | Age: 73
End: 2023-10-23
Payer: COMMERCIAL

## 2023-10-25 ENCOUNTER — TELEPHONE (OUTPATIENT)
Dept: OPHTHALMOLOGY | Facility: CLINIC | Age: 73
End: 2023-10-25
Payer: COMMERCIAL

## 2023-10-25 NOTE — TELEPHONE ENCOUNTER
Sherine [phone]     Reviewed with daughter ahrinder-op nursing calls pt's 1-3 days prior to surgery to review information.    Recommended daughter calling harinder- op nursing to verify information.    Offered number and daughter states already has number.    Fidel Falcon RN 3:11 PM 10/25/23          [facility] Call [facility]    Phone Message    May a detailed message be left on voicemail: yes     Reason for Call: Other: Pt's daughter Sherine called looking for specific instructions regarding pt's surgery tomorrow. Arrival time, diet restrictions, etc....Sherine states that instructions were given to her Uncle who has some mental challenges. Please call Sherine leo with surgical instructions for pts procedure tomorrow. Thank you.      Action Taken: Message routed to:  [facility]    Travel Screening: Not Applicable

## 2023-10-26 ENCOUNTER — TELEPHONE (OUTPATIENT)
Dept: OPHTHALMOLOGY | Facility: CLINIC | Age: 73
End: 2023-10-26

## 2023-10-26 ENCOUNTER — HOSPITAL ENCOUNTER (OUTPATIENT)
Facility: AMBULATORY SURGERY CENTER | Age: 73
Discharge: HOME | End: 2023-10-26
Attending: STUDENT IN AN ORGANIZED HEALTH CARE EDUCATION/TRAINING PROGRAM
Payer: COMMERCIAL

## 2023-10-26 ENCOUNTER — ANESTHESIA (OUTPATIENT)
Dept: SURGERY | Facility: AMBULATORY SURGERY CENTER | Age: 73
End: 2023-10-26
Payer: COMMERCIAL

## 2023-10-26 VITALS
SYSTOLIC BLOOD PRESSURE: 121 MMHG | BODY MASS INDEX: 30.2 KG/M2 | WEIGHT: 210.98 LBS | HEIGHT: 70 IN | DIASTOLIC BLOOD PRESSURE: 73 MMHG | HEART RATE: 79 BPM | RESPIRATION RATE: 16 BRPM | TEMPERATURE: 97 F | OXYGEN SATURATION: 98 %

## 2023-10-26 DIAGNOSIS — H25.813 COMBINED FORM OF AGE-RELATED CATARACT, BOTH EYES: Primary | ICD-10-CM

## 2023-10-26 PROCEDURE — 66984 XCAPSL CTRC RMVL W/O ECP: CPT | Mod: RT | Performed by: STUDENT IN AN ORGANIZED HEALTH CARE EDUCATION/TRAINING PROGRAM

## 2023-10-26 PROCEDURE — 66984 XCAPSL CTRC RMVL W/O ECP: CPT | Mod: RT

## 2023-10-26 DEVICE — LENS CC60WF 20.5 CLAREON UV ASPHERIC BICONVEX IOL: Type: IMPLANTABLE DEVICE | Site: EYE | Status: FUNCTIONAL

## 2023-10-26 RX ORDER — OXYCODONE HYDROCHLORIDE 5 MG/1
10 TABLET ORAL
Status: DISCONTINUED | OUTPATIENT
Start: 2023-10-26 | End: 2023-10-27 | Stop reason: HOSPADM

## 2023-10-26 RX ORDER — SODIUM CHLORIDE, SODIUM LACTATE, POTASSIUM CHLORIDE, CALCIUM CHLORIDE 600; 310; 30; 20 MG/100ML; MG/100ML; MG/100ML; MG/100ML
INJECTION, SOLUTION INTRAVENOUS CONTINUOUS
Status: DISCONTINUED | OUTPATIENT
Start: 2023-10-26 | End: 2023-10-26 | Stop reason: HOSPADM

## 2023-10-26 RX ORDER — SILDENAFIL 25 MG/1
25 TABLET, FILM COATED ORAL
COMMUNITY
Start: 2022-07-18 | End: 2023-10-26

## 2023-10-26 RX ORDER — ONDANSETRON 4 MG/1
4 TABLET, ORALLY DISINTEGRATING ORAL EVERY 30 MIN PRN
Status: DISCONTINUED | OUTPATIENT
Start: 2023-10-26 | End: 2023-10-27 | Stop reason: HOSPADM

## 2023-10-26 RX ORDER — ATOVAQUONE AND PROGUANIL HYDROCHLORIDE 250; 100 MG/1; MG/1
1 TABLET, FILM COATED ORAL DAILY
COMMUNITY
Start: 2023-01-23

## 2023-10-26 RX ORDER — MOXIFLOXACIN 5 MG/ML
1 SOLUTION/ DROPS OPHTHALMIC
Status: COMPLETED | OUTPATIENT
Start: 2023-10-26 | End: 2023-10-26

## 2023-10-26 RX ORDER — OXYCODONE HYDROCHLORIDE 5 MG/1
5 TABLET ORAL
Status: DISCONTINUED | OUTPATIENT
Start: 2023-10-26 | End: 2023-10-27 | Stop reason: HOSPADM

## 2023-10-26 RX ORDER — MOXIFLOXACIN 5 MG/ML
1 SOLUTION/ DROPS OPHTHALMIC 4 TIMES DAILY
Qty: 3 ML | Refills: 0 | Status: SHIPPED | OUTPATIENT
Start: 2023-10-26 | End: 2023-11-09

## 2023-10-26 RX ORDER — PROPARACAINE HYDROCHLORIDE 5 MG/ML
1 SOLUTION/ DROPS OPHTHALMIC ONCE
Status: COMPLETED | OUTPATIENT
Start: 2023-10-26 | End: 2023-10-26

## 2023-10-26 RX ORDER — ACETAMINOPHEN 325 MG/1
975 TABLET ORAL ONCE
Status: DISCONTINUED | OUTPATIENT
Start: 2023-10-26 | End: 2023-10-26 | Stop reason: HOSPADM

## 2023-10-26 RX ORDER — KETOROLAC TROMETHAMINE 5 MG/ML
1 SOLUTION OPHTHALMIC 4 TIMES DAILY
Qty: 5 ML | Refills: 0 | Status: SHIPPED | OUTPATIENT
Start: 2023-10-26 | End: 2023-11-09

## 2023-10-26 RX ORDER — ONDANSETRON 2 MG/ML
4 INJECTION INTRAMUSCULAR; INTRAVENOUS EVERY 30 MIN PRN
Status: DISCONTINUED | OUTPATIENT
Start: 2023-10-26 | End: 2023-10-27 | Stop reason: HOSPADM

## 2023-10-26 RX ORDER — PREDNISOLONE ACETATE 10 MG/ML
1 SUSPENSION/ DROPS OPHTHALMIC 4 TIMES DAILY
Qty: 5 ML | Refills: 0 | Status: SHIPPED | OUTPATIENT
Start: 2023-10-26 | End: 2023-11-09

## 2023-10-26 RX ORDER — SODIUM CHLORIDE, SODIUM LACTATE, POTASSIUM CHLORIDE, CALCIUM CHLORIDE 600; 310; 30; 20 MG/100ML; MG/100ML; MG/100ML; MG/100ML
INJECTION, SOLUTION INTRAVENOUS CONTINUOUS
Status: DISCONTINUED | OUTPATIENT
Start: 2023-10-26 | End: 2023-10-27 | Stop reason: HOSPADM

## 2023-10-26 RX ORDER — DICLOFENAC SODIUM 1 MG/ML
1 SOLUTION/ DROPS OPHTHALMIC
Status: COMPLETED | OUTPATIENT
Start: 2023-10-26 | End: 2023-10-26

## 2023-10-26 RX ORDER — POTASSIUM CHLORIDE 1500 MG/1
20 TABLET, EXTENDED RELEASE ORAL
COMMUNITY
Start: 2023-08-24

## 2023-10-26 RX ORDER — TAMSULOSIN HYDROCHLORIDE 0.4 MG/1
1 CAPSULE ORAL DAILY
COMMUNITY
Start: 2023-08-18

## 2023-10-26 RX ORDER — CYCLOPENTOLAT/TROPIC/PHENYLEPH 1%-1%-2.5%
1 DROPS (EA) OPHTHALMIC (EYE)
Status: COMPLETED | OUTPATIENT
Start: 2023-10-26 | End: 2023-10-26

## 2023-10-26 RX ORDER — TETRACAINE HYDROCHLORIDE 5 MG/ML
SOLUTION OPHTHALMIC PRN
Status: DISCONTINUED | OUTPATIENT
Start: 2023-10-26 | End: 2023-10-26 | Stop reason: HOSPADM

## 2023-10-26 RX ORDER — LIDOCAINE 40 MG/G
CREAM TOPICAL
Status: DISCONTINUED | OUTPATIENT
Start: 2023-10-26 | End: 2023-10-26 | Stop reason: HOSPADM

## 2023-10-26 RX ORDER — ACETAMINOPHEN 325 MG/1
975 TABLET ORAL ONCE
Status: COMPLETED | OUTPATIENT
Start: 2023-10-26 | End: 2023-10-26

## 2023-10-26 RX ADMIN — MOXIFLOXACIN 1 DROP: 5 SOLUTION/ DROPS OPHTHALMIC at 11:03

## 2023-10-26 RX ADMIN — SODIUM CHLORIDE, SODIUM LACTATE, POTASSIUM CHLORIDE, CALCIUM CHLORIDE: 600; 310; 30; 20 INJECTION, SOLUTION INTRAVENOUS at 11:14

## 2023-10-26 RX ADMIN — Medication 1 DROP: at 11:14

## 2023-10-26 RX ADMIN — DICLOFENAC SODIUM 1 DROP: 1 SOLUTION/ DROPS OPHTHALMIC at 11:03

## 2023-10-26 RX ADMIN — PROPARACAINE HYDROCHLORIDE 1 DROP: 5 SOLUTION/ DROPS OPHTHALMIC at 10:52

## 2023-10-26 RX ADMIN — ACETAMINOPHEN 975 MG: 325 TABLET ORAL at 10:53

## 2023-10-26 RX ADMIN — DICLOFENAC SODIUM 1 DROP: 1 SOLUTION/ DROPS OPHTHALMIC at 10:55

## 2023-10-26 RX ADMIN — SODIUM CHLORIDE, SODIUM LACTATE, POTASSIUM CHLORIDE, CALCIUM CHLORIDE: 600; 310; 30; 20 INJECTION, SOLUTION INTRAVENOUS at 11:40

## 2023-10-26 RX ADMIN — MOXIFLOXACIN 1 DROP: 5 SOLUTION/ DROPS OPHTHALMIC at 11:14

## 2023-10-26 RX ADMIN — DICLOFENAC SODIUM 1 DROP: 1 SOLUTION/ DROPS OPHTHALMIC at 11:14

## 2023-10-26 RX ADMIN — MOXIFLOXACIN 1 DROP: 5 SOLUTION/ DROPS OPHTHALMIC at 10:55

## 2023-10-26 RX ADMIN — Medication 1 DROP: at 10:56

## 2023-10-26 RX ADMIN — Medication 1 DROP: at 11:03

## 2023-10-26 NOTE — ANESTHESIA CARE TRANSFER NOTE
Patient: Jossue Hickey    Procedure: Procedure(s):  RIGHT EYE PHACOEMULSIFICATION, CATARACT, WITH INTRAOCULAR LENS IMPLANT       Diagnosis: Combined form of age-related cataract, both eyes [H25.813]  Diagnosis Additional Information: No value filed.    Anesthesia Type:   No value filed.     Note:    Oropharynx: oropharynx clear of all foreign objects and spontaneously breathing  Level of Consciousness: awake  Oxygen Supplementation: room air    Independent Airway: airway patency satisfactory and stable  Dentition: dentition unchanged  Vital Signs Stable: post-procedure vital signs reviewed and stable  Report to RN Given: handoff report given  Patient transferred to: Phase II    Handoff Report: Identifed the Patient, Identified the Reponsible Provider, Reviewed the pertinent medical history, Discussed the surgical course, Reviewed Intra-OP anesthesia mangement and issues during anesthesia, Set expectations for post-procedure period and Allowed opportunity for questions and acknowledgement of understanding      Vitals:  Vitals Value Taken Time   BP     Temp     Pulse     Resp     SpO2         Electronically Signed By: TANNA Mora CRNA  October 26, 2023  12:23 PM

## 2023-10-26 NOTE — DISCHARGE INSTRUCTIONS
"Cataract Surgery Post-Operative Instructions      You have undergone cataract surgery today. Take it easy as the mild anesthesia wears off.     After cataract surgery you will have an eye shield over your eye and things might be a little blurry. This is normal. It will clear up over the coming days. Your eye may feel a little scratchy and this should get better over the next few days    It is okay to resume your previous diet    Use Tylenol (if there is no contraindication from a medical standpoint) if you experience any eye pain    Avoid sleeping on or putting pressure on the operated eye    Sleep with the eye shield at bedtime for the first week    You may resume light activity tomorrow, but no heavy lifting, no straining, no bending over especially the first week    Do not rub the eyes, no water in the eyes (no pools or hot tubs or tap water); Please wait until tomorrow to shower, and when you do shower take care not to get water in the surgical eye    You can continue the following eye drops starting tonight:    Vigamox (tan cap) 1 drop four times a day to surgical eye for 1 week, then stop  Ketorolac (grey top) 1 drop four times a day to surgical eye for 1 week, then 1 drop three times a day to surgical eye for 1 week, then 1 drop two times a day to surgical eye for 1 week , then 1 drop once a day to surgical eye for 1 week, then stop  Predforte (pink or white top) 1 drop four times a day to surgical eye for 1 week, then 1 drop three times a day to surgical eye for 1 week, then 1 drop two times a day to surgical eye for 1 week , then 1 drop once a day to surgical eye for 1 week, then stop  OPTIONAL: preservative free artificial tears (refresh, thera tears, systane, etc) 1 drop 4-6 times per day as needed (DO NOT use Visine or Clear Eyes or any eye drop product that states \"red out\")  If on glaucoma medications, then continue regular eye pressure drops  **wait 5-10 minutes between each drop**  **can refrigerate " eye drops to reduce burning or stinging sensation**    Please contact Dr Griffin at [phone] if any issues arise    Your follow up appointment is as scheduled on 10/27/2023 2:30 PM     [facility] - Department of Ophthalmology  [address] [place] [address], [id number]    [facility]  Home Care Following Anesthesia  For 24 hours after surgery:  Get plenty of rest.  A responsible adult must stay with you for at least 24 hours after you leave the surgery center.  Do not drive or use heavy equipment.  If you have weakness or tingling, don't drive or use heavy equipment until this feeling goes away.   Do not drink alcohol.   Avoid strenuous or risky activities.  Ask for help when climbing stairs.  You may feel lightheaded.  IF so, sit for a few minutes before standing.  Have someone help you get up.   If you have nausea (feel sick to your stomach): Drink only clear liquids such as apple juice, ginger ale, broth or 7-Up.  Rest may also help.  Be sure to drink enough fluids.  Move to a regular diet as you feel able.   You may have a slight fever.  Call the doctor if your fever is over 100 F (37.7 C) (taken under the tongue) or lasts longer than 24 hours.  You may have a dry mouth, a sore throat, muscle aches or trouble sleeping. These should go away after 24 hours.  Do not make important or legal decisions.   It is recommended to avoid smoking.               Tips for taking pain medications  To get the best pain relief possible, remember these points:  Take pain medications as directed, before pain becomes severe.  Pain medication can upset your stomach: taking it with food may help.  Constipation is a common side effect of pain medication. Drink plenty of  fluids.  Eat foods high in fiber. Take a stool softener if recommended by your doctor or pharmacist.  Do not drink alcohol, drive or operate machinery while taking pain medications.  Ask about other ways to control  pain, such as with heat, ice or relaxation.    Tylenol/Acetaminophen Consumption    If you feel your pain relief is insufficient, you may take Tylenol/Acetaminophen in addition to your narcotic pain medication.   Be careful not to exceed 4,000 mg of Tylenol/Acetaminophen in a 24 hour period from all sources.  If you are taking extra strength Tylenol/acetaminophen (500 mg), the maximum dose is 8 tablets in 24 hours.  If you are taking regular strength acetaminophen (325 mg), the maximum dose is 12 tablets in 24 hours.    Call a doctor for any of the following:  Signs of infection (fever, growing tenderness at the surgery site, a large amount of drainage or bleeding, severe pain, foul-smelling drainage, redness, swelling).  It has been over 8 to 10 hours since surgery and you are still not able to urinate (pass water).  Headache for over 24 hours.  Numbness, tingling or weakness the day after surgery (if you had spinal anesthesia).  Signs of Covid-19 infection (temperature over 100 degrees, shortness of breath, cough, loss of taste/smell, generalized body aches, persistent headache, chills, sore throat, nausea/vomiting/diarrhea)  Your doctor is:  Dr. Rekha Velasco, Otolaryngology: [phone]  Dr. Kimmy Griffin, Ophthalmology: [phone]               Or dial [phone] and ask for the resident on call for:  Ophthalmology  For emergency care, call the:  [facility]:  [phone] (TTY for hearing impaired: [phone])

## 2023-10-26 NOTE — TELEPHONE ENCOUNTER
Reached patient's house and son answered phone. Stated Mr Hickey was sleeping, but overall doing well. Call back number provided for patient to reach with any questions or concerns, otherwise patient scheduled to see me in clinic tomorrow.

## 2023-10-26 NOTE — ANESTHESIA PREPROCEDURE EVALUATION
"Anesthesia Pre-Procedure Evaluation    Patient: Jossue Hickey   MRN: [id number] : 1950        Procedure : Procedure(s):  RIGHT EYE PHACOEMULSIFICATION, CATARACT, WITH INTRAOCULAR LENS IMPLANT  POSSIBLE VITRECTOMY, ANTERIOR          Past Medical History:   Diagnosis Date     Hypercholesterolemia      Hypertension       History reviewed. No pertinent surgical history.   No Known Allergies   Social History     Tobacco Use     Smoking status: Never     Smokeless tobacco: Never   Substance Use Topics     Alcohol use: Not on file      Wt Readings from Last 1 Encounters:   10/26/23 95.7 kg (210 lb 15.7 oz)        Anesthesia Evaluation   Pt has not had prior anesthetic         ROS/MED HX  ENT/Pulmonary:  - neg pulmonary ROS     Neurologic:  - neg neurologic ROS     Cardiovascular:     (+) Dyslipidemia hypertension- -   -  - -                                      METS/Exercise Tolerance: >4 METS    Hematologic:  - neg hematologic  ROS     Musculoskeletal:   (+)  arthritis,             GI/Hepatic:     (+) GERD (asymptomatic),                   Renal/Genitourinary:  - neg Renal ROS     Endo:  - neg endo ROS     Psychiatric/Substance Use:  - neg psychiatric ROS     Infectious Disease:  - neg infectious disease ROS     Malignancy:  - neg malignancy ROS     Other:  - neg other ROS        Physical Exam    Airway        Mallampati: I   TM distance: > 3 FB   Neck ROM: full   Mouth opening: > 3 cm    Respiratory Devices and Support         Dental       (+) Minor Abnormalities - some fillings, tiny chips      Cardiovascular          Rhythm and rate: regular and normal     Pulmonary    Unable to assess       breath sounds clear to auscultation       OUTSIDE LABS:  CBC: No results found for: \"WBC\", \"HGB\", \"HCT\", \"PLT\"  BMP:   Lab Results   Component Value Date     2023     2022    POTASSIUM 3.2 (L) 2023    POTASSIUM 3.0 (L) 2022    CHLORIDE 100 2023    CHLORIDE 97 (L) 2022    CO2 25 " "07/20/2023    CO2 24 12/05/2022    BUN 13.4 07/20/2023    BUN 16.8 12/05/2022    CR 1.01 07/20/2023    CR 0.99 12/05/2022    GLC 86 07/20/2023    GLC 93 12/05/2022     COAGS: No results found for: \"PTT\", \"INR\", \"FIBR\"  POC: No results found for: \"BGM\", \"HCG\", \"HCGS\"  HEPATIC:   Lab Results   Component Value Date    ALBUMIN 4.4 12/05/2022     OTHER:   Lab Results   Component Value Date    CRIS 9.0 07/20/2023    PHOS 2.8 12/05/2022    MAG 2.2 09/22/2022       Anesthesia Plan    ASA Status:  1    NPO Status:  NPO Appropriate    Anesthesia Type: MAC.     - Reason for MAC: immobility needed      Maintenance: Balanced.        Consents    Anesthesia Plan(s) and associated risks, benefits, and realistic alternatives discussed. Questions answered and patient/representative(s) expressed understanding.     - Discussed: Risks, Benefits and Alternatives for BOTH SEDATION and the PROCEDURE were discussed     - Discussed with:  Patient      - Extended Intubation/Ventilatory Support Discussed: No.      - Patient is DNR/DNI Status: No     Use of blood products discussed: No .     Postoperative Care    Pain management: IV analgesics.   PONV prophylaxis: Ondansetron (or other 5HT-3)     Comments:              Epi Shoemaker MD  "

## 2023-10-26 NOTE — BRIEF OP NOTE
[facility]    Brief Operative Note    Pre-operative diagnosis: Combined form of age-related cataract, right eye  Post-operative diagnosis Same as pre-operative diagnosis    Procedure: RIGHT EYE PHACOEMULSIFICATION, CATARACT, WITH INTRAOCULAR LENS IMPLANT, Right - Eye    Surgeon: Surgeon(s) and Role:     * Kimmy Griffin MD - Primary  Anesthesia: MAC with Topical   Estimated Blood Loss: None    Drains: None  Specimens: * No specimens in log *  Findings:   None.  Complications: None.  Implants:   Implant Name Type Inv. Item Serial No.  Lot No. LRB No. Used Action   LENS CC60WF 20.5 CL[employer] UV ASPHERIC BICONVEX IOL - [id number] Lens/Eye Implant LENS CC60WF 20.5 CLAREON UV ASPHERIC BICONVEX IOL [id number] [employer]  Right 1 Implanted

## 2023-10-26 NOTE — OP NOTE
PREOPERATIVE DIAGNOSIS:   Combined form of age-related cataract, Right eye   POSTOPERATIVE DIAGNOSIS:   Combined form of age-related cataract, Right eye  PROCEDURES: Cataract extraction with intraocular lens implant Right eye.  SURGEON: Kimmy Griffin M.D.  ASSISTANT: none  ANESTHESIA: MAC with Topical   CDE: 16.76  INDICATIONS: The patient Jossue Hickey presented to the eye clinic with decreased vision secondary to cataract in the Right eye. The risks, benefits and alternatives to cataract extraction were discussed. The patient elected to proceed. All questions were answered to the patient's satisfaction.     DESCRIPTION OF PROCEDURE: Prior to the procedure, appropriate cardiac and respiratory monitors were applied to the patient.  In the pre-operative holding area, a drop of topical proparacaine 0.5% followed by three rounds of cyclopentolate 1%-tropicamide 1%-phenylephrine 2.5%,  by five minutes apart, were instilled into the procedure eye.  The patient was brought to the operating room where a surgical pause was carried out to identify with all members of the surgical team the correct surgical site.      With adequate anesthesia, the Right eye was prepped and draped in the usual sterile fashion for ophthalmic surgery. A lid speculum was placed, and the operating microscope was rotated into position. The surgeon was seated temporally. A paracentesis was created at the limbus with the surgeon's non dominant hand.  Through this limbal paracentesis, the anterior chamber was filled with preservative-free lidocaine, followed by trypan blue, followed by preservative free epinephrine-BSS, followed by dispersive viscoelastic. Trypan blue stain was elected due to a poor red reflex in the setting of dense cataract. Next, the main wound was created via a clear corneal incision with the surgeon's dominant hand using a 2.6 mm keratome blade. A capsulorrhexis was initiated using a 25-gauge bent needle, Cystotome, and  a continuous curvilinear capsulorhexis was completed in a circular fashion using the [employer]trata forceps. Following the capsulorhexis, some of the OVD was egressed from the main wound, and then the lens nucleus was carefully hydrodissected using balanced salt solution on an AC cannula.  The lens nucleus was rotated and removed using phacoemulsification in a stop and chop technique.  Residual cortical material was removed using irrigation-aspiration.  The capsular bag was then filled with cohesive viscoelastic.  A +20.5 diopter CC60WF lens was inserted into the capsular bag.  The lens power selected was reviewed using the intraocular lens power measurements that were obtained preoperatively to confirm that the correct lens was selected for the desired post-operative refractive state. After lens insertion, the residual viscoelastic was removed in its entirety with the I/A handpiece. The wounds were hydrated with balanced salt solution and found to be self-sealing. Preservative free intracameral moxifloxacin was administered. With a weck-mikhail spear the wounds were checked and no leaks were noted. The intraocular pressure was noted to be within the normal range to digital palpation. Subconjunctival dexamethasone (2mg) was injected superiorly.    The lid speculum was removed, one drop of timolol 0.5% and a small ribbon of maxitrol ophthalmic ointment was instilled in the operative eye. An eye shield were carefully placed over the operative eye. The patient tolerated the procedure well without any complications.    PLAN: The patient will be discharged to home and will follow up for post operative visit as scheduled. Counseled return precautions  EBL:  None  COMPLICATIONS:  None  Implant Name Type Inv. Item Serial No.  Lot No. LRB No. Used Action   LENS CC60WF 20.5 [employer] UV ASPHERIC BICONVEX IOL - [id number] Lens/Eye Implant LENS CC60WF 20.5 [employer] UV ASPHERIC BICONVEX IOL [id number] [employer]  Right 1  Implanted

## 2023-10-27 ENCOUNTER — OFFICE VISIT (OUTPATIENT)
Dept: OPHTHALMOLOGY | Facility: CLINIC | Age: 73
End: 2023-10-27
Attending: STUDENT IN AN ORGANIZED HEALTH CARE EDUCATION/TRAINING PROGRAM
Payer: COMMERCIAL

## 2023-10-27 DIAGNOSIS — Z98.890 POSTOPERATIVE EYE STATE: Primary | ICD-10-CM

## 2023-10-27 DIAGNOSIS — Z96.1 PSEUDOPHAKIA, RIGHT EYE: ICD-10-CM

## 2023-10-27 PROCEDURE — 99024 POSTOP FOLLOW-UP VISIT: CPT | Performed by: STUDENT IN AN ORGANIZED HEALTH CARE EDUCATION/TRAINING PROGRAM

## 2023-10-27 PROCEDURE — G0463 HOSPITAL OUTPT CLINIC VISIT: HCPCS | Performed by: STUDENT IN AN ORGANIZED HEALTH CARE EDUCATION/TRAINING PROGRAM

## 2023-10-27 RX ORDER — SILICONE ADHESIVE 1.5" X 3"
1 SHEET (EA) TOPICAL AT BEDTIME
Qty: 15 ML | Refills: 0 | Status: SHIPPED | OUTPATIENT
Start: 2023-10-27 | End: 2023-11-26

## 2023-10-27 ASSESSMENT — SLIT LAMP EXAM - LIDS
COMMENTS: NORMAL
COMMENTS: NORMAL

## 2023-10-27 ASSESSMENT — EXTERNAL EXAM - RIGHT EYE: OD_EXAM: WNL

## 2023-10-27 ASSESSMENT — VISUAL ACUITY
OS_SC+: -3
OS_SC: 20/50
OD_SC: 20/200
METHOD: SNELLEN - LINEAR

## 2023-10-27 ASSESSMENT — TONOMETRY
OS_IOP_MMHG: 8
IOP_METHOD: ICARE
OD_IOP_MMHG: 9

## 2023-10-27 ASSESSMENT — EXTERNAL EXAM - LEFT EYE: OS_EXAM: WNL

## 2023-10-27 NOTE — NURSING NOTE
Chief Complaints and History of Present Illnesses   Patient presents with    Post Op (Ophthalmology) Right Eye     1 day cataract post op  RE     Chief Complaint(s) and History of Present Illness(es)       Post Op (Ophthalmology) Right Eye              Laterality: right eye    Associated symptoms: Negative for eye pain, flashes and floaters    Comments: 1 day cataract post op  RE              Comments    DOS 10/27/2023. Pt son with to translate today. Pt states RE is doing well. No pain, flashes, or floaters.   Pt using moxifloxacin, prednisolone, and ketorolac RE    Urmila Garcia OA 2:23 PM October 27, 2023

## 2023-10-28 NOTE — PROGRESS NOTES
HPI       Post Op (Ophthalmology) Right Eye    In right eye.  Associated symptoms include Negative for eye pain, flashes and floaters. Additional comments: 1 day cataract post op  RE             Comments    DOS 10/26/2023. Pt son with to translate today. Pt states RE is doing well. No pain, flashes, or floaters.   Pt using moxifloxacin, prednisolone, and ketorolac RE    Urmila Garcia OA 2:23 PM October 27, 2023             Last edited by Kimmy Griffin MD on 10/28/2023  7:41 AM.          Review of systems for the eyes was negative other than the pertinent positives/negatives listed in the HPI.    Ocular Meds: postop drops as instructed    Ocular Hx: CE/IOL OD 10/26/23; cataract OS; refractive error OU    FOHx: no family history of glaucoma or blindness    PMHx:     Past Medical History:   Diagnosis Date    Hypercholesterolemia     Hypertension      Assessment & Plan      Jossue Hickey is a 73 year old male with the following diagnoses: family here to help interpret    Postoperative eye state  Pseudophakia OD  - Doing well, IOP okay, mayra negative, moderate edema due to dense cataract as expected  - Keep eye shield in place at night for 7 days  - post-operative drops and taper according to instructions  -- vigamox QID x 1 week to surgical eye  -- predforte 6 times per day/QID/TID/BID/daily/stop to procedure eye, taper weekly  -- ketorolac QID/TID/BID/daily/stop to procedure eye, taper weekly  -- Suzie 128 5% - QID to procedure eye until seen ( Rx provided)  - Post-operative do's and don'ts reviewed, questions answered  - patient has emergency contact information  - Counseled endophthalmitis/RD/return precautions    Combined form of age-related cataract OS  - visually significant and affecting ADLs,  - patient is unsure which eye is dominant  - left eye already scheduled    Refractive error  - will hold on refraction until POM1 after cataract surgery    Counseled return/RD precautions    Patient disposition:    Return for Follow Up for post op cataract surgery visit, or sooner changes.      Attending Physician Attestation:  Complete documentation of historical and exam elements from today's encounter can be found in the full encounter summary report (not reduplicated in this progress note).  I personally obtained the chief complaint(s) and history of present illness.  I confirmed and edited as necessary the review of systems, past medical/surgical history, family history, social history, and examination findings as documented by others; and I examined the patient myself.  I personally reviewed the relevant tests, images, and reports as documented above.  I formulated and edited as necessary the assessment and plan and discussed the findings and management plan with the patient and family. Today with Jossue Hickey, I reviewed the indications, risks, benefits, and alternatives of the proposed surgical procedure including, but not limited to, failure obtain the desired result  and need for additional surgery, bleeding, infection, loss of vision, loss of the eye, and the remote possibility of permanent damage to any organ system or death with the use of anesthesia.  I provided multiple opportunities for the questions, answered all questions to the best of my ability, and confirmed that my answers and my discussion were understood. -Kimmy Griffin MD

## 2023-11-01 ENCOUNTER — APPOINTMENT (OUTPATIENT)
Dept: UROLOGY | Facility: CLINIC | Age: 73
End: 2023-11-01
Payer: MEDICAID

## 2023-11-01 VITALS
SYSTOLIC BLOOD PRESSURE: 152 MMHG | OXYGEN SATURATION: 95 % | HEART RATE: 71 BPM | RESPIRATION RATE: 15 BRPM | HEIGHT: 66 IN | BODY MASS INDEX: 27.16 KG/M2 | DIASTOLIC BLOOD PRESSURE: 66 MMHG | WEIGHT: 169 LBS | TEMPERATURE: 98.4 F

## 2023-11-01 DIAGNOSIS — R97.20 ELEVATED PROSTATE, SPECIFIC ANTIGEN [PSA]: ICD-10-CM

## 2023-11-01 PROBLEM — Z00.00 ENCOUNTER FOR PREVENTIVE HEALTH EXAMINATION: Status: ACTIVE | Noted: 2023-11-01

## 2023-11-01 PROCEDURE — 99205 OFFICE O/P NEW HI 60 MIN: CPT

## 2023-11-02 LAB
APPEARANCE: CLEAR
BACTERIA: NEGATIVE /HPF
BILIRUBIN URINE: NEGATIVE
BLOOD URINE: NEGATIVE
CAST: 0 /LPF
COLOR: YELLOW
EPITHELIAL CELLS: 0 /HPF
GLUCOSE QUALITATIVE U: NEGATIVE MG/DL
KETONES URINE: NEGATIVE MG/DL
LEUKOCYTE ESTERASE URINE: ABNORMAL
MICROSCOPIC-UA: NORMAL
NITRITE URINE: NEGATIVE
PH URINE: 5.5
PROTEIN URINE: NEGATIVE MG/DL
RED BLOOD CELLS URINE: 0 /HPF
SPECIFIC GRAVITY URINE: 1.02
UROBILINOGEN URINE: 0.2 MG/DL
WHITE BLOOD CELLS URINE: 1 /HPF

## 2023-11-03 ENCOUNTER — OFFICE VISIT (OUTPATIENT)
Dept: OPHTHALMOLOGY | Facility: CLINIC | Age: 73
End: 2023-11-03
Attending: STUDENT IN AN ORGANIZED HEALTH CARE EDUCATION/TRAINING PROGRAM
Payer: COMMERCIAL

## 2023-11-03 DIAGNOSIS — Z98.890 POSTOPERATIVE EYE STATE: Primary | ICD-10-CM

## 2023-11-03 DIAGNOSIS — Z96.1 PSEUDOPHAKIA, RIGHT EYE: ICD-10-CM

## 2023-11-03 LAB — BACTERIA UR CULT: NORMAL

## 2023-11-03 PROCEDURE — G0463 HOSPITAL OUTPT CLINIC VISIT: HCPCS | Performed by: STUDENT IN AN ORGANIZED HEALTH CARE EDUCATION/TRAINING PROGRAM

## 2023-11-03 PROCEDURE — 99024 POSTOP FOLLOW-UP VISIT: CPT | Performed by: STUDENT IN AN ORGANIZED HEALTH CARE EDUCATION/TRAINING PROGRAM

## 2023-11-03 ASSESSMENT — EXTERNAL EXAM - LEFT EYE: OS_EXAM: WNL

## 2023-11-03 ASSESSMENT — VISUAL ACUITY
OD_PH_SC+: -2
OS_PH_SC: 20/40
OD_SC: 20/50
OS_SC+: -1
OS_SC: 20/125
OD_PH_SC: 20/20
METHOD: SNELLEN - LINEAR
OD_SC+: +2
OS_PH_SC+: -1

## 2023-11-03 ASSESSMENT — TONOMETRY
IOP_METHOD: TONOPEN
OS_IOP_MMHG: 10
OD_IOP_MMHG: 11

## 2023-11-03 ASSESSMENT — EXTERNAL EXAM - RIGHT EYE: OD_EXAM: WNL

## 2023-11-03 ASSESSMENT — SLIT LAMP EXAM - LIDS
COMMENTS: NORMAL
COMMENTS: NORMAL

## 2023-11-03 NOTE — NURSING NOTE
Chief Complaints and History of Present Illnesses   Patient presents with    Post Op (Ophthalmology) Right Eye     POW1 s/p CE/IOL right eye      Chief Complaint(s) and History of Present Illness(es)       Post Op (Ophthalmology) Right Eye              Associated symptoms: Negative for eye pain, flashes and floaters    Treatments tried: eye drops    Pain scale: 0/10    Comments: POW1 s/p CE/IOL right eye               Comments    Pt's son is translating for today's visit.   Pt states right eye is feeling good and healing well.   No flashes or floaters. Compliant with eyedrops  No other concerns.     Kalpesh Barrow 10:50 AM November 3, 2023

## 2023-11-05 ENCOUNTER — ANESTHESIA EVENT (OUTPATIENT)
Dept: SURGERY | Facility: AMBULATORY SURGERY CENTER | Age: 73
End: 2023-11-05
Payer: COMMERCIAL

## 2023-11-09 ENCOUNTER — ANESTHESIA (OUTPATIENT)
Dept: SURGERY | Facility: AMBULATORY SURGERY CENTER | Age: 73
End: 2023-11-09
Payer: COMMERCIAL

## 2023-11-09 ENCOUNTER — HOSPITAL ENCOUNTER (OUTPATIENT)
Facility: AMBULATORY SURGERY CENTER | Age: 73
Discharge: HOME | End: 2023-11-09
Attending: STUDENT IN AN ORGANIZED HEALTH CARE EDUCATION/TRAINING PROGRAM
Payer: COMMERCIAL

## 2023-11-09 ENCOUNTER — OFFICE VISIT (OUTPATIENT)
Dept: OPHTHALMOLOGY | Facility: CLINIC | Age: 73
End: 2023-11-09
Payer: COMMERCIAL

## 2023-11-09 VITALS
HEART RATE: 78 BPM | DIASTOLIC BLOOD PRESSURE: 64 MMHG | WEIGHT: 210 LBS | SYSTOLIC BLOOD PRESSURE: 114 MMHG | HEIGHT: 72 IN | TEMPERATURE: 97.1 F | BODY MASS INDEX: 28.44 KG/M2 | RESPIRATION RATE: 16 BRPM | OXYGEN SATURATION: 96 %

## 2023-11-09 DIAGNOSIS — Z98.890 POSTOPERATIVE EYE STATE: Primary | ICD-10-CM

## 2023-11-09 DIAGNOSIS — Z96.1 PSEUDOPHAKIA, BOTH EYES: ICD-10-CM

## 2023-11-09 DIAGNOSIS — H25.813 COMBINED FORM OF AGE-RELATED CATARACT, BOTH EYES: Primary | ICD-10-CM

## 2023-11-09 PROCEDURE — 66984 XCAPSL CTRC RMVL W/O ECP: CPT | Mod: 79 | Performed by: STUDENT IN AN ORGANIZED HEALTH CARE EDUCATION/TRAINING PROGRAM

## 2023-11-09 PROCEDURE — 66984 XCAPSL CTRC RMVL W/O ECP: CPT | Mod: LT

## 2023-11-09 PROCEDURE — 99024 POSTOP FOLLOW-UP VISIT: CPT | Performed by: STUDENT IN AN ORGANIZED HEALTH CARE EDUCATION/TRAINING PROGRAM

## 2023-11-09 DEVICE — LENS CC60WF 21.0 CLAREON UV ASPHERIC BICONVEX IOL: Type: IMPLANTABLE DEVICE | Site: EYE | Status: FUNCTIONAL

## 2023-11-09 RX ORDER — LIDOCAINE 40 MG/G
CREAM TOPICAL
Status: DISCONTINUED | OUTPATIENT
Start: 2023-11-09 | End: 2023-11-09 | Stop reason: HOSPADM

## 2023-11-09 RX ORDER — FENTANYL CITRATE 50 UG/ML
50 INJECTION, SOLUTION INTRAMUSCULAR; INTRAVENOUS
Status: DISCONTINUED | OUTPATIENT
Start: 2023-11-09 | End: 2023-11-10 | Stop reason: HOSPADM

## 2023-11-09 RX ORDER — KETOROLAC TROMETHAMINE 5 MG/ML
1 SOLUTION OPHTHALMIC 4 TIMES DAILY
Qty: 5 ML | Refills: 0 | Status: SHIPPED | OUTPATIENT
Start: 2023-11-09

## 2023-11-09 RX ORDER — BALANCED SALT SOLUTION 6.4; .75; .48; .3; 3.9; 1.7 MG/ML; MG/ML; MG/ML; MG/ML; MG/ML; MG/ML
SOLUTION OPHTHALMIC PRN
Status: DISCONTINUED | OUTPATIENT
Start: 2023-11-09 | End: 2023-11-09 | Stop reason: HOSPADM

## 2023-11-09 RX ORDER — OXYCODONE HYDROCHLORIDE 5 MG/1
5 TABLET ORAL
Status: DISCONTINUED | OUTPATIENT
Start: 2023-11-09 | End: 2023-11-10 | Stop reason: HOSPADM

## 2023-11-09 RX ORDER — TIMOLOL MALEATE 5 MG/ML
SOLUTION/ DROPS OPHTHALMIC PRN
Status: DISCONTINUED | OUTPATIENT
Start: 2023-11-09 | End: 2023-11-09 | Stop reason: HOSPADM

## 2023-11-09 RX ORDER — KETOROLAC TROMETHAMINE 5 MG/ML
SOLUTION OPHTHALMIC
Qty: 5 ML | Refills: 0 | Status: SHIPPED | OUTPATIENT
Start: 2023-11-09 | End: 2023-11-23

## 2023-11-09 RX ORDER — MOXIFLOXACIN 5 MG/ML
1 SOLUTION/ DROPS OPHTHALMIC 4 TIMES DAILY
Qty: 3 ML | Refills: 0 | Status: SHIPPED | OUTPATIENT
Start: 2023-11-09 | End: 2023-11-16

## 2023-11-09 RX ORDER — FENTANYL CITRATE 50 UG/ML
INJECTION, SOLUTION INTRAMUSCULAR; INTRAVENOUS PRN
Status: DISCONTINUED | OUTPATIENT
Start: 2023-11-09 | End: 2023-11-09

## 2023-11-09 RX ORDER — OXYCODONE HYDROCHLORIDE 5 MG/1
10 TABLET ORAL
Status: DISCONTINUED | OUTPATIENT
Start: 2023-11-09 | End: 2023-11-10 | Stop reason: HOSPADM

## 2023-11-09 RX ORDER — ACETAMINOPHEN 325 MG/1
975 TABLET ORAL ONCE
Status: COMPLETED | OUTPATIENT
Start: 2023-11-09 | End: 2023-11-09

## 2023-11-09 RX ORDER — SODIUM CHLORIDE, SODIUM LACTATE, POTASSIUM CHLORIDE, CALCIUM CHLORIDE 600; 310; 30; 20 MG/100ML; MG/100ML; MG/100ML; MG/100ML
INJECTION, SOLUTION INTRAVENOUS CONTINUOUS
Status: DISCONTINUED | OUTPATIENT
Start: 2023-11-09 | End: 2023-11-09 | Stop reason: HOSPADM

## 2023-11-09 RX ORDER — MOXIFLOXACIN 5 MG/ML
1 SOLUTION/ DROPS OPHTHALMIC
Status: COMPLETED | OUTPATIENT
Start: 2023-11-09 | End: 2023-11-09

## 2023-11-09 RX ORDER — ONDANSETRON 4 MG/1
4 TABLET, ORALLY DISINTEGRATING ORAL EVERY 30 MIN PRN
Status: DISCONTINUED | OUTPATIENT
Start: 2023-11-09 | End: 2023-11-10 | Stop reason: HOSPADM

## 2023-11-09 RX ORDER — LIDOCAINE HYDROCHLORIDE 10 MG/ML
INJECTION, SOLUTION EPIDURAL; INFILTRATION; INTRACAUDAL; PERINEURAL PRN
Status: DISCONTINUED | OUTPATIENT
Start: 2023-11-09 | End: 2023-11-09 | Stop reason: HOSPADM

## 2023-11-09 RX ORDER — PREDNISOLONE ACETATE 10 MG/ML
SUSPENSION/ DROPS OPHTHALMIC
Qty: 5 ML | Refills: 0 | Status: SHIPPED | OUTPATIENT
Start: 2023-11-09 | End: 2023-11-23

## 2023-11-09 RX ORDER — SODIUM CHLORIDE, SODIUM LACTATE, POTASSIUM CHLORIDE, CALCIUM CHLORIDE 600; 310; 30; 20 MG/100ML; MG/100ML; MG/100ML; MG/100ML
INJECTION, SOLUTION INTRAVENOUS CONTINUOUS PRN
Status: DISCONTINUED | OUTPATIENT
Start: 2023-11-09 | End: 2023-11-09

## 2023-11-09 RX ORDER — CYCLOPENTOLAT/TROPIC/PHENYLEPH 1%-1%-2.5%
1 DROPS (EA) OPHTHALMIC (EYE)
Status: COMPLETED | OUTPATIENT
Start: 2023-11-09 | End: 2023-11-09

## 2023-11-09 RX ORDER — DEXAMETHASONE SODIUM PHOSPHATE 4 MG/ML
INJECTION, SOLUTION INTRA-ARTICULAR; INTRALESIONAL; INTRAMUSCULAR; INTRAVENOUS; SOFT TISSUE PRN
Status: DISCONTINUED | OUTPATIENT
Start: 2023-11-09 | End: 2023-11-09 | Stop reason: HOSPADM

## 2023-11-09 RX ORDER — DICLOFENAC SODIUM 1 MG/ML
1 SOLUTION/ DROPS OPHTHALMIC
Status: COMPLETED | OUTPATIENT
Start: 2023-11-09 | End: 2023-11-09

## 2023-11-09 RX ORDER — SODIUM CHLORIDE, SODIUM LACTATE, POTASSIUM CHLORIDE, CALCIUM CHLORIDE 600; 310; 30; 20 MG/100ML; MG/100ML; MG/100ML; MG/100ML
INJECTION, SOLUTION INTRAVENOUS CONTINUOUS
Status: DISCONTINUED | OUTPATIENT
Start: 2023-11-09 | End: 2023-11-10 | Stop reason: HOSPADM

## 2023-11-09 RX ORDER — PROPARACAINE HYDROCHLORIDE 5 MG/ML
1 SOLUTION/ DROPS OPHTHALMIC ONCE
Status: COMPLETED | OUTPATIENT
Start: 2023-11-09 | End: 2023-11-09

## 2023-11-09 RX ORDER — ONDANSETRON 2 MG/ML
4 INJECTION INTRAMUSCULAR; INTRAVENOUS EVERY 30 MIN PRN
Status: DISCONTINUED | OUTPATIENT
Start: 2023-11-09 | End: 2023-11-10 | Stop reason: HOSPADM

## 2023-11-09 RX ORDER — TETRACAINE HYDROCHLORIDE 5 MG/ML
SOLUTION OPHTHALMIC PRN
Status: DISCONTINUED | OUTPATIENT
Start: 2023-11-09 | End: 2023-11-09 | Stop reason: HOSPADM

## 2023-11-09 RX ORDER — MOXIFLOXACIN IN NACL,ISO-OS/PF 0.3MG/0.3
SYRINGE (ML) INTRAOCULAR PRN
Status: DISCONTINUED | OUTPATIENT
Start: 2023-11-09 | End: 2023-11-09 | Stop reason: HOSPADM

## 2023-11-09 RX ORDER — PREDNISOLONE ACETATE 10 MG/ML
SUSPENSION/ DROPS OPHTHALMIC
Qty: 10 ML | Refills: 0 | Status: SHIPPED | OUTPATIENT
Start: 2023-11-09 | End: 2023-12-07

## 2023-11-09 RX ORDER — KETOROLAC TROMETHAMINE 5 MG/ML
SOLUTION OPHTHALMIC
Qty: 10 ML | Refills: 0 | Status: SHIPPED | OUTPATIENT
Start: 2023-11-09 | End: 2023-12-07

## 2023-11-09 RX ORDER — ACETAMINOPHEN 325 MG/1
975 TABLET ORAL ONCE
Status: DISCONTINUED | OUTPATIENT
Start: 2023-11-09 | End: 2023-11-10 | Stop reason: HOSPADM

## 2023-11-09 RX ADMIN — SODIUM CHLORIDE, SODIUM LACTATE, POTASSIUM CHLORIDE, CALCIUM CHLORIDE: 600; 310; 30; 20 INJECTION, SOLUTION INTRAVENOUS at 10:13

## 2023-11-09 RX ADMIN — DICLOFENAC SODIUM 1 DROP: 1 SOLUTION/ DROPS OPHTHALMIC at 09:51

## 2023-11-09 RX ADMIN — PROPARACAINE HYDROCHLORIDE 1 DROP: 5 SOLUTION/ DROPS OPHTHALMIC at 09:50

## 2023-11-09 RX ADMIN — MOXIFLOXACIN 1 DROP: 5 SOLUTION/ DROPS OPHTHALMIC at 09:57

## 2023-11-09 RX ADMIN — MOXIFLOXACIN 1 DROP: 5 SOLUTION/ DROPS OPHTHALMIC at 09:52

## 2023-11-09 RX ADMIN — DICLOFENAC SODIUM 1 DROP: 1 SOLUTION/ DROPS OPHTHALMIC at 09:56

## 2023-11-09 RX ADMIN — Medication 1 DROP: at 09:52

## 2023-11-09 RX ADMIN — Medication 1 DROP: at 10:04

## 2023-11-09 RX ADMIN — MOXIFLOXACIN 1 DROP: 5 SOLUTION/ DROPS OPHTHALMIC at 10:04

## 2023-11-09 RX ADMIN — ACETAMINOPHEN 975 MG: 325 TABLET ORAL at 09:50

## 2023-11-09 RX ADMIN — DICLOFENAC SODIUM 1 DROP: 1 SOLUTION/ DROPS OPHTHALMIC at 10:04

## 2023-11-09 RX ADMIN — Medication 1 DROP: at 09:57

## 2023-11-09 RX ADMIN — FENTANYL CITRATE 25 MCG: 50 INJECTION, SOLUTION INTRAMUSCULAR; INTRAVENOUS at 10:32

## 2023-11-09 ASSESSMENT — VISUAL ACUITY
METHOD: SNELLEN - LINEAR
OS_SC: 20/80

## 2023-11-09 ASSESSMENT — TONOMETRY
OS_IOP_MMHG: 13
IOP_METHOD: TONOPEN

## 2023-11-09 ASSESSMENT — EXTERNAL EXAM - LEFT EYE: OS_EXAM: WNL

## 2023-11-09 ASSESSMENT — SLIT LAMP EXAM - LIDS
COMMENTS: NORMAL
COMMENTS: NORMAL

## 2023-11-09 ASSESSMENT — EXTERNAL EXAM - RIGHT EYE: OD_EXAM: WNL

## 2023-11-09 NOTE — PROGRESS NOTES
HPI    Patient Is here post op day zero s/p CE/IOL left eye. Doing well. No new flashes, floaters, or curtain down visual field.  Last edited by Kimmy Griffin MD on 11/9/2023 11:43 AM.          Review of systems for the eyes was negative other than the pertinent positives/negatives listed in the HPI.    Ocular Meds: postop drops as instructed    Ocular Hx: CE/IOL OD 10/26/23; CE/IOL left eye 11/9/2023; refractive error OU    FOHx: no family history of glaucoma or blindness    PMHx:     Past Medical History:   Diagnosis Date    Hypercholesterolemia     Hypertension      Assessment & Plan     Jossue Hickey is a 73 year old male with the following diagnoses: family here to help interpret along with in person     Postoperative eye state  Pseudophakia OS  - Doing well, IOP wnl, mayra negative, no signs of infection, happy with results  - Keep eye shield in place at night for 7 days  - post-operative drops and taper according to instructions  -- vigamox QID x 1 week to surgical eye  -- predforte QID/TID/BID/daily/stop to procedure eye, taper weekly  -- ketorolac QID/TID/BID/daily/stop to procedure eye, taper weekly  - Post-operative do's and don'ts reviewed, questions answered  - patient has emergency contact information  - Counseled endophthalmitis/RD/return precautions     Pseudophakia OD  - Doing well, mayra negative, no signs of infection, happy with results  - post-operative drops and taper according to instructions  -- predforte BID/daily/stop to procedure eye, taper weekly  -- ketorolac BID/daily/stop to procedure eye, taper weekly  - Post-operative do's and don'ts reviewed, questions answered  - patient has emergency contact information  - Counseled endophthalmitis/RD/return precautions    Refractive error  - will hold on refraction until POM1 after cataract surgery    Counseled return/RD precautions    Patient disposition:   Return for Follow Up [place] for post op cataract surgery visit, or sooner  changes.    Attending Physician Attestation:  Complete documentation of historical and exam elements from today's encounter can be found in the full encounter summary report (not reduplicated in this progress note).  I personally obtained the chief complaint(s) and history of present illness.  I confirmed and edited as necessary the review of systems, past medical/surgical history, family history, social history, and examination findings as documented by others; and I examined the patient myself.  I personally reviewed the relevant tests, images, and reports as documented above.  I formulated and edited as necessary the assessment and plan and discussed the findings and management plan with the patient and family. . - Kimmy Griffin MD

## 2023-11-09 NOTE — ANESTHESIA PREPROCEDURE EVALUATION
"Anesthesia Pre-Procedure Evaluation    Patient: Jossue Hickey   MRN: [id number] : 1950        Procedure : Procedure(s):  LEFT EYE PHACOEMULSIFICATION, CATARACT, WITH INTRAOCULAR LENS IMPLANT  POSSIBLE VITRECTOMY, ANTERIOR          Past Medical History:   Diagnosis Date    Hypercholesterolemia     Hypertension       Past Surgical History:   Procedure Laterality Date    PHACOEMULSIFICATION CLEAR CORNEA WITH STANDARD INTRAOCULAR LENS IMPLANT Right 10/26/2023    Procedure: RIGHT EYE PHACOEMULSIFICATION, CATARACT, WITH INTRAOCULAR LENS IMPLANT;  Surgeon: Kimmy Griffin MD;  Location: [facility] [place] OR      No Known Allergies   Social History     Tobacco Use    Smoking status: Never    Smokeless tobacco: Never   Substance Use Topics    Alcohol use: Not on file      Wt Readings from Last 1 Encounters:   10/26/23 95.7 kg (210 lb 15.7 oz)        Anesthesia Evaluation   Pt has not had prior anesthetic         ROS/MED HX  ENT/Pulmonary:  - neg pulmonary ROS     Neurologic:  - neg neurologic ROS     Cardiovascular:     (+) Dyslipidemia hypertension- -   -  - -                                      METS/Exercise Tolerance: >4 METS    Hematologic:  - neg hematologic  ROS     Musculoskeletal:   (+)  arthritis,             GI/Hepatic:     (+) GERD (asymptomatic),                   Renal/Genitourinary:  - neg Renal ROS     Endo:  - neg endo ROS     Psychiatric/Substance Use:  - neg psychiatric ROS     Infectious Disease:  - neg infectious disease ROS     Malignancy:  - neg malignancy ROS     Other:  - neg other ROS          Physical Exam    Airway        Mallampati: II       Respiratory Devices and Support         Dental       (+) Modest Abnormalities - crowns, retainers, 1 or 2 missing teeth      Cardiovascular          Rhythm and rate: regular     Pulmonary           breath sounds clear to auscultation           OUTSIDE LABS:  CBC: No results found for: \"WBC\", \"HGB\", \"HCT\", \"PLT\"  BMP:   Lab Results   Component Value Date     " "07/20/2023     12/05/2022    POTASSIUM 3.2 (L) 07/20/2023    POTASSIUM 3.0 (L) 12/05/2022    CHLORIDE 100 07/20/2023    CHLORIDE 97 (L) 12/05/2022    CO2 25 07/20/2023    CO2 24 12/05/2022    BUN 13.4 07/20/2023    BUN 16.8 12/05/2022    CR 1.01 07/20/2023    CR 0.99 12/05/2022    GLC 86 07/20/2023    GLC 93 12/05/2022     COAGS: No results found for: \"PTT\", \"INR\", \"FIBR\"  POC: No results found for: \"BGM\", \"HCG\", \"HCGS\"  HEPATIC:   Lab Results   Component Value Date    ALBUMIN 4.4 12/05/2022     OTHER:   Lab Results   Component Value Date    CRIS 9.0 07/20/2023    PHOS 2.8 12/05/2022    MAG 2.2 09/22/2022       Anesthesia Plan    ASA Status:  2    NPO Status:  NPO Appropriate    Anesthesia Type: MAC.     - Reason for MAC: straight local not clinically adequate              Consents    Anesthesia Plan(s) and associated risks, benefits, and realistic alternatives discussed. Questions answered and patient/representative(s) expressed understanding.     - Discussed:     - Discussed with:  Patient            Postoperative Care            Comments:                Myles Barr MD  "

## 2023-11-09 NOTE — ANESTHESIA CARE TRANSFER NOTE
Patient: Jossue Hickey    Procedure: Procedure(s):  LEFT EYE PHACOEMULSIFICATION, CATARACT, WITH INTRAOCULAR LENS IMPLANT       Diagnosis: Combined form of age-related cataract, both eyes [H25.813]  Diagnosis Additional Information: No value filed.    Anesthesia Type:   MAC     Note:    Oropharynx: oropharynx clear of all foreign objects and spontaneously breathing  Level of Consciousness: awake  Oxygen Supplementation: room air    Independent Airway: airway patency satisfactory and stable  Dentition: dentition unchanged  Vital Signs Stable: post-procedure vital signs reviewed and stable  Report to RN Given: handoff report given  Patient transferred to: Phase II    Handoff Report: Identifed the Patient, Identified the Reponsible Provider, Reviewed the pertinent medical history, Discussed the surgical course, Reviewed Intra-OP anesthesia mangement and issues during anesthesia, Set expectations for post-procedure period and Allowed opportunity for questions and acknowledgement of understanding      Vitals:  Vitals Value Taken Time   /68 11/09/23 1109   Temp 36.2  C (97.1  F) 11/09/23 1109   Pulse 75 11/09/23 1109   Resp 16 11/09/23 1109   SpO2 97 % 11/09/23 1109       Electronically Signed By: TANNA Chapman CRNA  November 9, 2023  11:11 AM

## 2023-11-09 NOTE — OP NOTE
PREOPERATIVE DIAGNOSIS:   Combined form of age-related catarct of Left eye   POSTOPERATIVE DIAGNOSIS:   Combined form of age-related cataract of Left eye  PROCEDURES: Cataract extraction with intraocular lens implant Left eye.  SURGEON: Kimmy Griffin M.D.  ASSISTANT: None  ANESTHESIA: MAC with Topical   CDE: 13.85  INDICATIONS: The patient Jossue Hickey presented to the eye clinic with decreased vision secondary to cataract in the Left eye. The risks, benefits and alternatives to cataract extraction were discussed. The patient elected to proceed. All questions were answered to the patient's satisfaction.     DESCRIPTION OF PROCEDURE: Prior to the procedure, appropriate cardiac and respiratory monitors were applied to the patient.  In the pre-operative holding area, a drop of topical proparacaine 0.5% followed by three rounds of cyclopentolate 1%-tropicamide 1%-phenylephrine 2.5%,  by five minutes apart, were instilled into the procedure eye.  The patient was brought to the operating room where a surgical pause was carried out to identify with all members of the surgical team the correct surgical site.      With adequate anesthesia, the Left eye was prepped and draped in the usual sterile fashion for ophthalmic surgery. A lid speculum was placed, and the operating microscope was rotated into position. The surgeon was seated temporally. A paracentesis was created at the limbus with the surgeon's non dominant hand.  Through this limbal paracentesis, the anterior chamber was filled with preservative-free lidocaine, followed by trypan blue, followed by preservative free epinephrine-BSS, followed by dispersive viscoelastic. Trypan blue stain was elected due to a poor red reflex in the setting of dense cataract. Next, the main wound was created via a clear corneal incision with the surgeon's dominant hand using a 2.6 mm keratome blade. A capsulorrhexis was initiated using a 25-gauge bent needle, Cystotome, and a  continuous curvilinear capsulorhexis was completed in a circular fashion using the Utrata forceps. Following the capsulorhexis, some of the OVD was egressed from the main wound, and then the lens nucleus was carefully hydrodissected using balanced salt solution on an AC cannula.  The lens nucleus was rotated and removed using phacoemulsification in a stop and chop technique.  Residual cortical material was removed using irrigation-aspiration.  The capsular bag was then filled with cohesive viscoelastic.  A +21.0 diopter CC60WF lens was inserted into the capsular bag.  The lens power selected was reviewed using the intraocular lens power measurements that were obtained preoperatively to confirm that the correct lens was selected for the desired post-operative refractive state. After lens insertion, the residual viscoelastic was removed in its entirety with the I/A handpiece. The wounds were hydrated with balanced salt solution and found to be self-sealing. Preservative free intracameral moxifloxacin was administered. With a weck-mikhail spear the wounds were checked and no leaks were noted. The intraocular pressure was noted to be within the normal range to digital palpation. Subconjunctival dexamethasone (2mg) was injected superiorly.    The lid speculum was removed, one drop of timolol 0.5% was instilled in the operative eye. An eye shield was carefully placed over the operative eye. The patient tolerated the procedure well without any complications.    PLAN: The patient will be discharged to home and will follow up for post operative visit as scheduled. Counseled return precautions  EBL:  None  COMPLICATIONS:  None  Implant Name Type Inv. Item Serial No.  Lot No. LRB No. Used Action   LENS CC60WF 21.0 [employer] UV ASPHERIC BICONVEX IOL - [id number] Lens/Eye Implant LENS CC60WF 21.0 CLAREON UV ASPHERIC BICONVEX IOL [id number] [employer]  Left 1 Implanted

## 2023-11-09 NOTE — BRIEF OP NOTE
[facility]    Brief Operative Note    Pre-operative diagnosis: Combined form of age-related cataract, left eye  Post-operative diagnosis Same as pre-operative diagnosis    Procedure: LEFT EYE PHACOEMULSIFICATION, CATARACT, WITH INTRAOCULAR LENS IMPLANT, Left - Eye    Surgeon: Surgeon(s) and Role:     * Kimmy Griffin MD - Primary  Anesthesia: MAC with Topical   Estimated Blood Loss: None    Drains: None  Specimens: * No specimens in log *  Findings:   None.  Complications: None.  Implants:   Implant Name Type Inv. Item Serial No.  Lot No. LRB No. Used Action   LENS CC60WF 21.0 CLAREON UV ASPHERIC BICONVEX IOL - [id number] Lens/Eye Implant LENS CC60WF 21.0 [employer] UV ASPHERIC BICONVEX IOL [id number] [employer]  Left 1 Implanted

## 2023-11-09 NOTE — ANESTHESIA POSTPROCEDURE EVALUATION
Patient: Jossue Hickey    Procedure: Procedure(s):  LEFT EYE PHACOEMULSIFICATION, CATARACT, WITH INTRAOCULAR LENS IMPLANT       Anesthesia Type:  MAC    Note:  Disposition: Outpatient   Postop Pain Control: Uneventful            Sign Out: Well controlled pain   PONV: No   Neuro/Psych: Uneventful            Sign Out: Acceptable/Baseline neuro status   Airway/Respiratory: Uneventful            Sign Out: Acceptable/Baseline resp. status   CV/Hemodynamics: Uneventful            Sign Out: Acceptable CV status; No obvious hypovolemia; No obvious fluid overload   Other NRE: NONE   DID A NON-ROUTINE EVENT OCCUR?            Last vitals:  Vitals Value Taken Time   /64 11/09/23 1131   Temp 36.2  C (97.1  F) 11/09/23 1131   Pulse 78 11/09/23 1131   Resp 16 11/09/23 1131   SpO2 96 % 11/09/23 1131       Electronically Signed By: Myles Barr MD  November 9, 2023  1:45 PM

## 2023-11-09 NOTE — DISCHARGE INSTRUCTIONS
"Cataract Surgery Post-Operative Instructions      You have undergone cataract surgery today. Take it easy as the mild anesthesia wears off.     After cataract surgery you will have an eye shield over your eye and things might be a little blurry. This is normal. It will clear up over the coming days. Your eye may feel a little scratchy and this should get better over the next few days    It is okay to resume your previous diet    Use Tylenol (if there is no contraindication from a medical standpoint) if you experience any eye pain    Avoid sleeping on or putting pressure on the operated eye    Sleep with the eye shield at bedtime for the first week    You may resume light activity tomorrow, but no heavy lifting, no straining, no bending over especially the first week    Do not rub the eyes, no water in the eyes (no pools or hot tubs or tap water); Please wait until tomorrow to shower, and when you do shower take care not to get water in the surgical eye    You can continue the following eye drops starting tonight:    Vigamox (tan cap) 1 drop four times a day to surgical eye for 1 week, then stop  Ketorolac (grey top) 1 drop four times a day to surgical eye for 1 week, then 1 drop three times a day to surgical eye for 1 week, then 1 drop two times a day to surgical eye for 1 week , then 1 drop once a day to surgical eye for 1 week, then stop  Predforte (pink or white top) 1 drop four times a day to surgical eye for 1 week, then 1 drop three times a day to surgical eye for 1 week, then 1 drop two times a day to surgical eye for 1 week , then 1 drop once a day to surgical eye for 1 week, then stop  OPTIONAL: preservative free artificial tears (refresh, thera tears, systane, etc) 1 drop 4-6 times per day as needed (DO NOT use Visine or Clear Eyes or any eye drop product that states \"red out\")  If on glaucoma medications, then continue regular eye pressure drops  **wait 5-10 minutes between each drop**  **can refrigerate " "eye drops to reduce burning or stinging sensation**    Please contact Dr Griffin at [phone] if any issues arise    Your follow up appointment is as scheduled on 11/17/2023 at 10:30 AM     [facility] - Department of Ophthalmology  [address], [place], [address]    [facility]  Home Care Following Anesthesia  For 24 hours after surgery:  Get plenty of rest.  A responsible adult must stay with you for at least 24 hours after you leave the surgery center.  Do not drive or use heavy equipment.  If you have weakness or tingling, don't drive or use heavy equipment until this feeling goes away.   Do not drink alcohol.   Avoid strenuous or risky activities.  Ask for help when climbing stairs.  You may feel lightheaded.  IF so, sit for a few minutes before standing.  Have someone help you get up.   If you have nausea (feel sick to your stomach): Drink only clear liquids such as apple juice, ginger ale, broth or 7-Up.  Rest may also help.  Be sure to drink enough fluids.  Move to a regular diet as you feel able.   You may have a slight fever.  Call the doctor if your fever is over 100 F (37.7 C) (taken under the tongue) or lasts longer than 24 hours.  You may have a dry mouth, a sore throat, muscle aches or trouble sleeping. These should go away after 24 hours.  Do not make important or legal decisions.   It is recommended to avoid smoking.        Today you received a Marcaine or bupivacaine block to numb the nerves near your surgery site.  This is a block using local anesthetic or \"numbing\" medication injected around the nerves to anesthetize or \"numb\" the area supplied by those nerves.  This block is injected into the muscle layer near your surgical site.  The medication may numb the location where you had surgery for 6-18 hours, but may last up to 24 hours.  If your surgical site is an arm or leg you should be careful with your affected limb, since it is " possible to injure your limb without being aware of it due to the numbing.  Until full feeling returns, you should guard against bumping or hitting your limb, and avoid extreme hot or cold temperatures on the skin.  As the block wears off, the feeling will return as a tingling or prickly sensation near your surgical site.  You will experience more discomfort from your incision as the feeling returns.  You may want to take a pain pill (a narcotic or Tylenol if this was prescribed by your surgeon) when you start to experience mild pain before the pain beccomes more severe.  If your pain medications do not control your pain you should notifiy your surgeon.    Tips for taking pain medications  To get the best pain relief possible, remember these points:  Take pain medications as directed, before pain becomes severe.  Pain medication can upset your stomach: taking it with food may help.  Constipation is a common side effect of pain medication. Drink plenty of  fluids.  Eat foods high in fiber. Take a stool softener if recommended by your doctor or pharmacist.  Do not drink alcohol, drive or operate machinery while taking pain medications.  Ask about other ways to control pain, such as with heat, ice or relaxation.    Tylenol/Acetaminophen Consumption    If you feel your pain relief is insufficient, you may take Tylenol/Acetaminophen in addition to your narcotic pain medication.   Be careful not to exceed 4,000 mg of Tylenol/Acetaminophen in a 24 hour period from all sources.  If you are taking extra strength Tylenol/acetaminophen (500 mg), the maximum dose is 8 tablets in 24 hours.  If you are taking regular strength acetaminophen (325 mg), the maximum dose is 12 tablets in 24 hours.    Call a doctor for any of the following:  Signs of infection (fever, growing tenderness at the surgery site, a large amount of drainage or bleeding, severe pain, foul-smelling drainage, redness, swelling).  It has been over 8 to 10 hours  since surgery and you are still not able to urinate (pass water).  Headache for over 24 hours.  Signs of Covid-19 infection (temperature over 100 degrees, shortness of breath, cough, loss of taste/smell, generalized body aches, persistent headache, chills, sore throat, nausea/vomiting/diarrhea)  Your doctor is:       Dr. Kimmy Griffin, Ophthalmology: [phone]               Or dial [phone] and ask for the resident on call for:  Ophthalmology  For emergency care, call the:  [facility]:  [phone] (TTY for hearing impaired: [phone])

## 2023-11-13 ENCOUNTER — MEDICAL CORRESPONDENCE (OUTPATIENT)
Dept: HEALTH INFORMATION MANAGEMENT | Facility: CLINIC | Age: 73
End: 2023-11-13

## 2023-11-13 ENCOUNTER — APPOINTMENT (OUTPATIENT)
Dept: UROLOGY | Facility: CLINIC | Age: 73
End: 2023-11-13
Payer: MEDICAID

## 2023-11-13 ENCOUNTER — MEDICAL CORRESPONDENCE (OUTPATIENT)
Dept: HEALTH INFORMATION MANAGEMENT | Facility: CLINIC | Age: 73
End: 2023-11-13
Payer: COMMERCIAL

## 2023-11-13 VITALS
SYSTOLIC BLOOD PRESSURE: 124 MMHG | DIASTOLIC BLOOD PRESSURE: 64 MMHG | HEART RATE: 65 BPM | OXYGEN SATURATION: 98 % | BODY MASS INDEX: 27.16 KG/M2 | TEMPERATURE: 97.9 F | HEIGHT: 66 IN | WEIGHT: 169 LBS

## 2023-11-13 PROCEDURE — 51797 INTRAABDOMINAL PRESSURE TEST: CPT

## 2023-11-13 PROCEDURE — 51741 ELECTRO-UROFLOWMETRY FIRST: CPT

## 2023-11-13 PROCEDURE — 51728 CYSTOMETROGRAM W/VP: CPT

## 2023-11-13 PROCEDURE — 51784 ANAL/URINARY MUSCLE STUDY: CPT

## 2023-11-14 ENCOUNTER — TRANSCRIBE ORDERS (OUTPATIENT)
Dept: OTHER | Age: 73
End: 2023-11-14

## 2023-11-14 DIAGNOSIS — H83.3X1 NOISE-INDUCED HEARING LOSS OF RIGHT EAR, UNSPECIFIED HEARING STATUS ON CONTRALATERAL SIDE: Primary | ICD-10-CM

## 2023-11-24 ENCOUNTER — OFFICE VISIT (OUTPATIENT)
Dept: OPHTHALMOLOGY | Facility: CLINIC | Age: 73
End: 2023-11-24
Attending: STUDENT IN AN ORGANIZED HEALTH CARE EDUCATION/TRAINING PROGRAM
Payer: COMMERCIAL

## 2023-11-24 DIAGNOSIS — H52.4 MYOPIA WITH PRESBYOPIA OF BOTH EYES: ICD-10-CM

## 2023-11-24 DIAGNOSIS — H52.13 MYOPIA WITH PRESBYOPIA OF BOTH EYES: ICD-10-CM

## 2023-11-24 DIAGNOSIS — Z98.890 POSTOPERATIVE EYE STATE: Primary | ICD-10-CM

## 2023-11-24 DIAGNOSIS — Z96.1 PSEUDOPHAKIA, BOTH EYES: ICD-10-CM

## 2023-11-24 PROCEDURE — 99024 POSTOP FOLLOW-UP VISIT: CPT | Performed by: STUDENT IN AN ORGANIZED HEALTH CARE EDUCATION/TRAINING PROGRAM

## 2023-11-24 PROCEDURE — G0463 HOSPITAL OUTPT CLINIC VISIT: HCPCS | Performed by: STUDENT IN AN ORGANIZED HEALTH CARE EDUCATION/TRAINING PROGRAM

## 2023-11-24 ASSESSMENT — VISUAL ACUITY
METHOD: SNELLEN - LINEAR
OD_PH_SC: 20/25
OS_PH_SC: 20/20
OS_PH_SC+: -2
OS_SC+: -2
OS_SC: 20/30
OD_SC: 20/50

## 2023-11-24 ASSESSMENT — EXTERNAL EXAM - LEFT EYE: OS_EXAM: WNL

## 2023-11-24 ASSESSMENT — REFRACTION_MANIFEST
OS_CYLINDER: SPHERE
OD_SPHERE: -1.00
OD_ADD: +2.75
OD_CYLINDER: SPHERE
OS_SPHERE: -0.75
OS_ADD: +2.75

## 2023-11-24 ASSESSMENT — TONOMETRY
IOP_METHOD: TONOPEN
OS_IOP_MMHG: 14
OD_IOP_MMHG: 15

## 2023-11-24 ASSESSMENT — CUP TO DISC RATIO
OS_RATIO: 0.1
OD_RATIO: 0.1

## 2023-11-24 ASSESSMENT — SLIT LAMP EXAM - LIDS
COMMENTS: NORMAL
COMMENTS: NORMAL

## 2023-11-24 ASSESSMENT — EXTERNAL EXAM - RIGHT EYE: OD_EXAM: WNL

## 2023-11-24 NOTE — PROGRESS NOTES
HPI       Post Op (Ophthalmology) Both Eyes     Additional comments:  CE/IOL OD 10/26/23; CE/IOL left eye 11/9/2023;             Comments    Pt states no problems or concerns   No eye pain    Jodee Tavares COT 10:18 AM November 24, 2023               Last edited by Jodee Tavares on 11/24/2023 10:18 AM.          Review of systems for the eyes was negative other than the pertinent positives/negatives listed in the HPI.    Ocular Meds: none    Ocular Hx: CE/IOL OD 10/26/23; CE/IOL left eye 11/9/2023; refractive error OU    FOHx: no family history of glaucoma or blindness    PMHx:     Past Medical History:   Diagnosis Date    Hypercholesterolemia     Hypertension      Assessment & Plan     Jossue Hickey is a 73 year old male with the following diagnoses: family here to help interpret along with in person     Postoperative eye state  Pseudophakia OU  - Doing well, IOP wnl, mayra negative, no signs of infection, happy with results  - off all drops  - Post-operative do's and don'ts reviewed, questions answered  - patient has emergency contact information  - Counseled endophthalmitis/RD/return precautions     Myopia of both eyes with presbyopia  - refraction reviewed and dispensed today with excellent BCVA    Counseled return/RD precautions    Patient disposition:   Return in about 1 year (around 11/24/2024) for Annual Visit, or sooner changes.    Attending Physician Attestation:  Complete documentation of historical and exam elements from today's encounter can be found in the full encounter summary report (not reduplicated in this progress note).  I personally obtained the chief complaint(s) and history of present illness.  I confirmed and edited as necessary the review of systems, past medical/surgical history, family history, social history, and examination findings as documented by others; and I examined the patient myself.  I personally reviewed the relevant tests, images, and reports as documented above.  I  formulated and edited as necessary the assessment and plan and discussed the findings and management plan with the patient and family. . - Kimmy Griffin MD

## 2023-11-24 NOTE — NURSING NOTE
Chief Complaints and History of Present Illnesses   Patient presents with    Post Op (Ophthalmology) Both Eyes      CE/IOL OD 10/26/23; CE/IOL left eye 11/9/2023;     Chief Complaint(s) and History of Present Illness(es)       Post Op (Ophthalmology) Both Eyes              Comments:  CE/IOL OD 10/26/23; CE/IOL left eye 11/9/2023;              Comments    Pt states no problems or concerns   No eye pain    Jodee Tavares COT 10:18 AM November 24, 2023

## 2023-12-05 ENCOUNTER — APPOINTMENT (OUTPATIENT)
Dept: UROLOGY | Facility: CLINIC | Age: 73
End: 2023-12-05
Payer: MEDICAID

## 2023-12-05 VITALS
OXYGEN SATURATION: 97 % | WEIGHT: 169 LBS | RESPIRATION RATE: 15 BRPM | BODY MASS INDEX: 27.16 KG/M2 | SYSTOLIC BLOOD PRESSURE: 129 MMHG | HEART RATE: 73 BPM | TEMPERATURE: 97.6 F | DIASTOLIC BLOOD PRESSURE: 60 MMHG | HEIGHT: 66 IN

## 2023-12-05 PROCEDURE — 99213 OFFICE O/P EST LOW 20 MIN: CPT

## 2023-12-07 ENCOUNTER — APPOINTMENT (OUTPATIENT)
Dept: INTERPRETER SERVICES | Facility: CLINIC | Age: 73
End: 2023-12-07
Payer: COMMERCIAL

## 2023-12-07 LAB — BACTERIA UR CULT: NORMAL

## 2023-12-13 ENCOUNTER — APPOINTMENT (OUTPATIENT)
Dept: UROLOGY | Facility: CLINIC | Age: 73
End: 2023-12-13
Payer: MEDICAID

## 2023-12-13 VITALS
OXYGEN SATURATION: 98 % | SYSTOLIC BLOOD PRESSURE: 136 MMHG | HEIGHT: 66 IN | RESPIRATION RATE: 15 BRPM | HEART RATE: 75 BPM | DIASTOLIC BLOOD PRESSURE: 65 MMHG | WEIGHT: 169 LBS | TEMPERATURE: 97.9 F | BODY MASS INDEX: 27.16 KG/M2

## 2023-12-13 PROCEDURE — 99215 OFFICE O/P EST HI 40 MIN: CPT | Mod: 25

## 2023-12-13 PROCEDURE — 52000 CYSTOURETHROSCOPY: CPT

## 2023-12-14 NOTE — HISTORY OF PRESENT ILLNESS
[FreeTextEntry1] : JONATHAN ALLISON Jan 1 1950  Language: English Date of First visit: 11/01/2023 Accompanied by: Bayleeb: son by telephone [phone] Contact info: Referring Provider/PCP: Dr. Alix Arriola Fax: [phone]  CC/ Problem List: LUTS - urgency and frequency =============================================================================== FIRST VISIT / Summary: Very pleasant 73 year old M here for LUTS / Elevated PSA. He has some occasional burning, slow stream with intermittency usually voids 3 times (triple void) before he feels empty. He is somewhat constipated, about every other day taking tamsulosin and oxybutynin daily. Discussed stool softeners. He goes about every hour and sometimes has urge incontinence.  ------------------------------------------------------------------------------------------- INTERVAL VISITS: The patient's medications and allergies were reviewed and edited below. Dated 12/05/2023  He is here for discussion after UDS. Discussed cysto ===============================================================================  PMH: DM, HTN, HLD Meds: tamsulosin 0.4mg, oxybutynin 10mg ER daily, alogliptin-metformin, HCTZ, ramipril, metoprolol, All: nkda FHx: No  malignancies SocHx: never smoker, no etoh, retired  PSH:  TURP approx 2018  ROS: Review of Systems is as per HPI unless otherwise denoted below  =============================================================================== DATA: LABS (SELECTED):--------------------------------------------------------------------------------------------------- 07/2018: PSA 11.5 per records from Dr. Couch 11/01/2023 PSA from Dr. Arriola 8.12 with 18% free  RADS:-------------------------------------------------------------------------------------------------------------------   PATHOLOGY/CYTOLOGY:------------------------------------------------------------------------------------------- 2018 or 2019 prostate biopsy: per family report showed inflammation only.  VOIDING STUDIES: ---------------------------------------------------------------------------------------------------- 11/01/2023 PVR 51 11/13/23: UDS showing 260 capacity, no DO, coordinated void however prolonged, interrupted flow pattern with high pressure.  STONE STUDIES: (Analysis/LLSA)----------------------------------------------------------------------------------   PROCEDURES: ----------------------------------------------------------------------------------------------- 12/13/2023 Cysto with 3cm prostate, bilateral hypertrophy from prostate regrowth   =============================================================================== PHYSICAL EXAM:   FOCUSED: ----------------------------------------------------------------------------------------------------------------   ======================================================================================= DISCUSSION: They are at increased risk of morbidity from additional testing and treatment that may be required based on the results of the above evaluation PSA >10 Severe LUTS -  Based on the patient's findings as documented above, they decided to proceed with planned surgical management. Risk factors as identified in the history and assessment / plan.  ======================================================================================= ASSESSMENT and PLAN  1. LUTS - mixed picture urge / obstructive symptoms - showed prostate regrowth. more on left side than right but clear obstruction for several centimeters - recommend TURP - medical clearance, need urine cx before sx  2. Elevated PSA - decreased from prior when he had negative biopsy - 2019 negative prostate biopsy per report  ======================================================================================= The total time personally spent preparing for this visit (reviewing test results, obtaining external history) and during the visit (ordering tests/medications, spent face to face with the patient / family and counseling them on the above), as well as after the visit (on clinical documentation and coordination with other care providers) was approximately 45 minutes discussing surgery in addition to his procedure.  Thank you for allowing me to assist in the care of your patient. Should you have any questions please do not hesitate to reach out to me.   Adolfo Julian MD [facility] Physician [facility]  [place] Office: [address], [address] [place] [address] T: [phone] F: [phone]  [place] Office: [address] [place] [address], 1st [place] [address] T: [phone] F: [phone]

## 2023-12-15 LAB — BACTERIA UR CULT: NORMAL

## 2023-12-27 ENCOUNTER — LAB REQUISITION (OUTPATIENT)
Dept: LAB | Facility: CLINIC | Age: 73
End: 2023-12-27
Payer: COMMERCIAL

## 2023-12-27 DIAGNOSIS — R05.1 ACUTE COUGH: ICD-10-CM

## 2023-12-27 PROCEDURE — 87081 CULTURE SCREEN ONLY: CPT | Mod: ORL | Performed by: NURSE PRACTITIONER

## 2023-12-29 LAB — BACTERIA SPEC CULT: NORMAL

## 2024-01-03 ENCOUNTER — APPOINTMENT (OUTPATIENT)
Dept: HEART AND VASCULAR | Facility: CLINIC | Age: 74
End: 2024-01-03
Payer: MEDICAID

## 2024-01-03 PROCEDURE — 81050 URINALYSIS VOLUME MEASURE: CPT

## 2024-01-05 LAB — BACTERIA UR CULT: NORMAL

## 2024-01-17 ENCOUNTER — TRANSCRIPTION ENCOUNTER (OUTPATIENT)
Age: 74
End: 2024-01-17

## 2024-01-17 NOTE — ASU PATIENT PROFILE, ADULT - NS PREOP UNDERSTANDS INFO
No solid food/dairy/candy/gum after midnight; water allowed before 10:45am tomorrow; patient reminded to come with photo ID/insurance/credit card; dress comfortable; no jewelries/contact/lens/valuables; no smoking/drinking alcohol/recreational drug use today; escort must have a photo ID; address and callback number given./yes

## 2024-01-18 ENCOUNTER — APPOINTMENT (OUTPATIENT)
Dept: UROLOGY | Facility: AMBULATORY SURGERY CENTER | Age: 74
End: 2024-01-18

## 2024-01-18 ENCOUNTER — TRANSCRIPTION ENCOUNTER (OUTPATIENT)
Age: 74
End: 2024-01-18

## 2024-01-18 ENCOUNTER — RESULT REVIEW (OUTPATIENT)
Age: 74
End: 2024-01-18

## 2024-01-18 ENCOUNTER — OUTPATIENT (OUTPATIENT)
Dept: OUTPATIENT SERVICES | Facility: HOSPITAL | Age: 74
LOS: 1 days | Discharge: HOME | End: 2024-01-18
Payer: MEDICAID

## 2024-01-18 VITALS
WEIGHT: 162.7 LBS | DIASTOLIC BLOOD PRESSURE: 65 MMHG | TEMPERATURE: 98 F | SYSTOLIC BLOOD PRESSURE: 136 MMHG | RESPIRATION RATE: 16 BRPM | HEIGHT: 66 IN | OXYGEN SATURATION: 100 % | HEART RATE: 76 BPM

## 2024-01-18 VITALS
OXYGEN SATURATION: 98 % | DIASTOLIC BLOOD PRESSURE: 59 MMHG | SYSTOLIC BLOOD PRESSURE: 121 MMHG | HEART RATE: 80 BPM | RESPIRATION RATE: 15 BRPM

## 2024-01-18 DIAGNOSIS — Z98.890 OTHER SPECIFIED POSTPROCEDURAL STATES: Chronic | ICD-10-CM

## 2024-01-18 LAB — GLUCOSE BLDC GLUCOMTR-MCNC: 185 MG/DL — HIGH (ref 70–99)

## 2024-01-18 PROCEDURE — 52601 PROSTATECTOMY (TURP): CPT

## 2024-01-18 PROCEDURE — 88305 TISSUE EXAM BY PATHOLOGIST: CPT | Mod: 26

## 2024-01-18 RX ORDER — RAMIPRIL 5 MG
1 CAPSULE ORAL
Refills: 0 | DISCHARGE

## 2024-01-18 RX ORDER — ASPIRIN/CALCIUM CARB/MAGNESIUM 324 MG
1 TABLET ORAL
Refills: 0 | DISCHARGE

## 2024-01-18 RX ORDER — OXYBUTYNIN CHLORIDE 5 MG
1 TABLET ORAL
Qty: 9 | Refills: 0
Start: 2024-01-18 | End: 2024-01-20

## 2024-01-18 RX ORDER — METOPROLOL TARTRATE 50 MG
1 TABLET ORAL
Refills: 0 | DISCHARGE

## 2024-01-18 RX ORDER — INSULIN LISPRO 100/ML
15 VIAL (ML) SUBCUTANEOUS
Refills: 0 | DISCHARGE

## 2024-01-18 RX ORDER — FENTANYL CITRATE 50 UG/ML
25 INJECTION INTRAVENOUS
Refills: 0 | Status: DISCONTINUED | OUTPATIENT
Start: 2024-01-18 | End: 2024-01-18

## 2024-01-18 RX ORDER — AMLODIPINE BESYLATE 2.5 MG/1
1 TABLET ORAL
Refills: 0 | DISCHARGE

## 2024-01-18 RX ORDER — OXYCODONE HYDROCHLORIDE 5 MG/1
5 TABLET ORAL ONCE
Refills: 0 | Status: DISCONTINUED | OUTPATIENT
Start: 2024-01-18 | End: 2024-01-18

## 2024-01-18 RX ORDER — HYDROCHLOROTHIAZIDE 25 MG
1 TABLET ORAL
Refills: 0 | DISCHARGE

## 2024-01-18 RX ORDER — ACETAMINOPHEN 500 MG
1000 TABLET ORAL ONCE
Refills: 0 | Status: COMPLETED | OUTPATIENT
Start: 2024-01-18 | End: 2024-01-18

## 2024-01-18 RX ORDER — METFORMIN HYDROCHLORIDE 850 MG/1
1 TABLET ORAL
Refills: 0 | DISCHARGE

## 2024-01-18 RX ORDER — SODIUM CHLORIDE 9 MG/ML
1000 INJECTION, SOLUTION INTRAVENOUS
Refills: 0 | Status: DISCONTINUED | OUTPATIENT
Start: 2024-01-18 | End: 2024-01-18

## 2024-01-18 RX ORDER — TAMSULOSIN HYDROCHLORIDE 0.4 MG/1
1 CAPSULE ORAL
Refills: 0 | DISCHARGE

## 2024-01-18 RX ORDER — INSULIN GLULISINE 100 [IU]/ML
45 INJECTION, SOLUTION SUBCUTANEOUS
Refills: 0 | DISCHARGE

## 2024-01-18 RX ORDER — SIMVASTATIN 20 MG/1
1 TABLET, FILM COATED ORAL
Refills: 0 | DISCHARGE

## 2024-01-18 RX ORDER — ONDANSETRON 8 MG/1
4 TABLET, FILM COATED ORAL ONCE
Refills: 0 | Status: DISCONTINUED | OUTPATIENT
Start: 2024-01-18 | End: 2024-01-18

## 2024-01-18 RX ADMIN — Medication 1000 MILLIGRAM(S): at 13:20

## 2024-01-18 NOTE — BRIEF OPERATIVE NOTE - NSICDXBRIEFPROCEDURE_GEN_ALL_CORE_FT
PROCEDURES:  Cystoscopy, with TURP 18-Jan-2024 17:29:59  Suyapa Godinez  DVIU (direct vision internal urethrotomy) 18-Jan-2024 17:30:27  Suyapa Godinez

## 2024-01-18 NOTE — ASU PREOP CHECKLIST - HEART RATE (BEATS/MIN)
[facility] EMERGENCY DEPT 
[address] [facility] [id number] [phone] Work/School Note Date: 11/30/2020 To Whom It May concern: 
 
Yessy Mitchell was evaulated by the following provider(s): 
Attending Provider: Mark Taylor MD.   [address] virus is suspected. Per the CDC guidelines we recommend home isolation until the following conditions are all met: 1. At least 10 days have passed since symptoms first appeared and 2. At least 24 hours have passed since last fever without the use of fever-reducing medications and 
3. Symptoms (e.g., cough, shortness of breath) have improved Sincerely, Farhad Souza MD
76

## 2024-01-18 NOTE — PRE-ANESTHESIA EVALUATION ADULT - NSANTHASARD_GEN_ALL_CORE
You have degenerative disease of your lower back. Your ct scan shows 3 locations of this. Your recent injury seems to have exacerbated these chronic symptoms and without you being able to take your normal medications due to upcoming surgery makes this worse. Please take the flexeril as directed but remain NPO as directed by your surgeon and not take on the day of. Return to the Er for any new, worsening or other concerning symptoms such as:    Your back pain is severe.  You cannot stand or walk.  You have difficulty controlling when you urinate or when you have a bowel movement.  You feel nauseous or you vomit.  Your feet get very cold.  You have numbness, tingling, weakness, or problems using your arms or legs.  You develop any of the following:  Shortness of breath.  Dizziness.  Pain in your legs.  Weakness in your buttocks or legs.  Discoloration of the skin on your toes or legs.  
2

## 2024-01-18 NOTE — BRIEF OPERATIVE NOTE - TYPE OF ANESTHESIA
Dr Reyes Levels updated on lab results and notified of pts request to go home. MD to consult MFM in am to decide plan of care, patient updated on plan of care and understands. General

## 2024-01-18 NOTE — ASU DISCHARGE PLAN (ADULT/PEDIATRIC) - ASU DC SPECIAL INSTRUCTIONSFT
TRANSURETHRAL RESECTION OF PROSTATE and DVIU    GENERAL: It is common to have blood in your urine after your procedure.  It may be pink or even red; and it is important to increase fluid intake to 2-3L of water per day to keep the urine as clear as possible. Please inform your doctor if you have a significant amount of clot in the urine or if you are unable to void at all.  The urine may clear and then become bloody again especially as you are more physically active. It is not uncommon to have some burning when you urinate, this will gradually improve. With a catheter in place, it is not uncommon to have occasional leakage or urine or blood around the catheter. Please call your urologist if this is excessive and/or the urine is not draining through the catheter into the bag.    CATHETER: Most patients are sent home with a Hancock catheter, which continuously drains the urine from the bladder. If you still have a catheter, the nurses will review instructions and care before you go home. For men, you may have a prescription for lidocaine jelly to apply to the tip of your penis, as needed, for catheter related discomfort.    UROLOGIC MEDICATIONS:  The following medications may have been sent to your pharmacy for stent related discomfort: Flomax (tamsulosin) 0.4mg at bedtime until stent removed, Ditropan (oxybutynin) 5mg every 8 hours as needed for bladder spasms, and Pyridium (phenazopyridine) 100mg every 8 hours as needed for kidney/bladder discomfort for max 3 days (Pyridium will make your urine orange).     PAIN: You may take Tylenol (acetaminophen) 650-975mg and/or Motrin (ibuprofen) 400-600mg, both available over the counter, for pain every 6 hours as needed. Do not exceed 4000mg of Tylenol (acetaminophen) daily. You may alternate these medications such that you take one or the other every 3 hours for around the clock pain coverage.    ANTIBIOTICS: You may be given a prescription for an antibiotic, please take this medication as instructed and be sure to complete the entire course.     STOOL SOFTENERS: Do not allow yourself to become constipated as straining may cause bleeding. Take stool softeners or a laxative (ex. Miralax, Colace, Senokot, ExLax, etc), available over the counter, if needed.    ANTICOAGULATION: If you are taking any blood thinning medications, please discuss with your urologist prior to restarting these medications unless otherwise specified.    BATHING: You may shower or bathe. If going home with hancock, shower only until catheter is removed.    DIET: You may resume your regular diet and regular medication regimen.    ACTIVITY: No heavy lifting or strenuous exercise until you are evaluated at your post-operative appointment. Otherwise, you may return to your usual level of physical activity.    FOLLOW-UP: Please come to Dr. Julian' Office on Monday/Tuesday of next week, January 22nd for catehter removal.     CALL YOUR UROLOGIST IF: You have any bleeding that does not stop, inability to void >8 hours, fever over 100.4 F, chills, persistent nausea/vomiting, changes in your incision concerning for infection, or if your pain is not controlled on your discharge pain medications.

## 2024-01-18 NOTE — ASU DISCHARGE PLAN (ADULT/PEDIATRIC) - CARE PROVIDER_API CALL
Adolfo Julian  Urology  [address], [address]  [place] [address]  Phone: [phone]  Fax: [phone]  Follow Up Time: 1-3 days

## 2024-01-18 NOTE — PRE-ANESTHESIA EVALUATION ADULT - NSANTHDIETYNSD_GEN_ALL_CORE
LVM- Called patient regarding colorectal cancer screening. If patient returns my call please remind her its highly important to return the FOBT kit as soon as possible. Orders were placed on 01/02/2022.    Writer could  FOBT kit from the patients home. Patient could set a time and date for  option during clinic hours. We can mail kit/drop off kit if needed as well.     Please send the writer a responds if patients returns call.         Yes

## 2024-01-18 NOTE — ASU DISCHARGE PLAN (ADULT/PEDIATRIC) - CALL YOUR DOCTOR IF YOU HAVE ANY OF THE FOLLOWING:
Bleeding that does not stop/Pain not relieved by Medications/Fever greater than (need to indicate Fahrenheit or Celsius)/Numbness, tingling, color or temperature change to extremity/Nausea and vomiting that does not stop/Unable to urinate

## 2024-01-22 ENCOUNTER — APPOINTMENT (OUTPATIENT)
Dept: UROLOGY | Facility: CLINIC | Age: 74
End: 2024-01-22
Payer: MEDICAID

## 2024-01-22 VITALS
SYSTOLIC BLOOD PRESSURE: 156 MMHG | WEIGHT: 169 LBS | RESPIRATION RATE: 15 BRPM | OXYGEN SATURATION: 99 % | HEART RATE: 96 BPM | BODY MASS INDEX: 27.16 KG/M2 | DIASTOLIC BLOOD PRESSURE: 76 MMHG | HEIGHT: 66 IN | TEMPERATURE: 97.8 F

## 2024-01-22 PROCEDURE — 99024 POSTOP FOLLOW-UP VISIT: CPT

## 2024-01-22 PROCEDURE — A4216: CPT | Mod: NC

## 2024-01-22 PROCEDURE — 51700 IRRIGATION OF BLADDER: CPT

## 2024-01-23 LAB — SURGICAL PATHOLOGY STUDY: SIGNIFICANT CHANGE UP

## 2024-01-24 NOTE — HISTORY OF PRESENT ILLNESS
[FreeTextEntry1] : JONATHAN ALLISON Jan 1 1950  Language: Yoruba Date of First visit: 11/01/2023 Accompanied by: Bayleeb: son by telephone [phone] Contact info: Referring Provider/PCP: Dr. Alix Arriola Fax: [phone]  CC/ Problem List: LUTS - urgency and frequency =============================================================================== FIRST VISIT / Summary: Very pleasant 73 year old M here for LUTS / Elevated PSA. He has some occasional burning, slow stream with intermittency usually voids 3 times (triple void) before he feels empty. He is somewhat constipated, about every other day taking tamsulosin and oxybutynin daily. Discussed stool softeners. He goes about every hour and sometimes has urge incontinence.  ------------------------------------------------------------------------------------------- INTERVAL VISITS: The patient's medications and allergies were reviewed and edited below. Dated 01/22/2024   Here for fill/pull TOV. ===============================================================================  PMH: DM, HTN, HLD Meds: tamsulosin 0.4mg, oxybutynin 10mg ER daily, alogliptin-metformin, HCTZ, ramipril, metoprolol, All: nkda FHx: No  malignancies SocHx: never smoker, no etoh, retired  PSH: TURP approx 2018  ROS: Review of Systems is as per HPI unless otherwise denoted below  =============================================================================== DATA: LABS (SELECTED):--------------------------------------------------------------------------------------------------- 07/2018: PSA 11.5 per records from Dr. Couch 11/01/2023 PSA from Dr. Arriola 8.12 with 18% free  RADS:-------------------------------------------------------------------------------------------------------------------   PATHOLOGY/CYTOLOGY:------------------------------------------------------------------------------------------- 2018 or 2019 prostate biopsy: per family report showed inflammation only.  VOIDING STUDIES: ---------------------------------------------------------------------------------------------------- 11/01/2023 PVR 51 11/13/23: UDS showing 260 capacity, no DO, coordinated void however prolonged, interrupted flow pattern with high pressure. 01/22/2024 Was only able to tolerate 100cc instillation, leaked with catheter removel. 20 min later Voided about 60cc PVR 27  STONE STUDIES: (Analysis/LLSA)----------------------------------------------------------------------------------   PROCEDURES: ----------------------------------------------------------------------------------------------- 12/13/2023 Cysto with 3cm prostate, bilateral hypertrophy from prostate regrowth   =============================================================================== PHYSICAL EXAM:   FOCUSED: ----------------------------------------------------------------------------------------------------------------   ======================================================================================= DISCUSSION: They are at increased risk of morbidity from additional testing and treatment that may be required based on the results of the above evaluation PSA >10 Severe LUTS - Based on the patient's findings as documented above, they decided to proceed with planned surgical management. Risk factors as identified in the history and assessment / plan. ======================================================================================= ASSESSMENT and PLAN  1. LUTS - mixed picture urge / obstructive symptoms - showed prostate regrowth. more on left side than right but clear obstruction for several centimeters - recommend TURP - medical clearance, need urine cx before sx  2. Elevated PSA - decreased from prior when he had negative biopsy - 2019 negative prostate biopsy per report  - addendum 01/24/2024 called having nocturia. He will drop off urine cx and start kegel exercises. Son to help find in Yoruba ======================================================================================= poa Thank you for allowing me to assist in the care of your patient. Should you have any questions please do not hesitate to reach out to me.   Adolfo Julian MD [facility] Physician [facility]  [place] Office: [address], [address] [place] [address] T: [phone] F: [phone]  [place] Office: [address] [place] [address], 1st [place] [address] T: [phone] F: [phone]

## 2024-01-25 LAB
APPEARANCE: ABNORMAL
BACTERIA: ABNORMAL /HPF
BILIRUBIN URINE: ABNORMAL
BLOOD URINE: ABNORMAL
COLOR: ABNORMAL
GLUCOSE QUALITATIVE U: NEGATIVE
KETONES URINE: NORMAL
LEUKOCYTE ESTERASE URINE: ABNORMAL
MICROSCOPIC-UA: NORMAL
NITRITE URINE: POSITIVE
PH URINE: 6.5
PROTEIN URINE: ABNORMAL
RED BLOOD CELLS URINE: ABNORMAL /HPF
SPECIFIC GRAVITY URINE: >=1.03
URINE COMMENTS: NORMAL
UROBILINOGEN URINE: NORMAL
WHITE BLOOD CELLS URINE: ABNORMAL /HPF

## 2024-01-25 RX ORDER — SULFAMETHOXAZOLE AND TRIMETHOPRIM 800; 160 MG/1; MG/1
800-160 TABLET ORAL TWICE DAILY
Qty: 20 | Refills: 0 | Status: ACTIVE | COMMUNITY
Start: 2024-01-25 | End: 1900-01-01

## 2024-01-27 PROBLEM — E11.9 TYPE 2 DIABETES MELLITUS WITHOUT COMPLICATIONS: Chronic | Status: ACTIVE | Noted: 2024-01-18

## 2024-01-27 PROBLEM — E78.00 PURE HYPERCHOLESTEROLEMIA, UNSPECIFIED: Chronic | Status: ACTIVE | Noted: 2024-01-18

## 2024-01-27 PROBLEM — I10 ESSENTIAL (PRIMARY) HYPERTENSION: Chronic | Status: ACTIVE | Noted: 2024-01-18

## 2024-01-28 LAB — BACTERIA UR CULT: ABNORMAL

## 2024-01-31 DIAGNOSIS — N39.0 URINARY TRACT INFECTION, SITE NOT SPECIFIED: ICD-10-CM

## 2024-01-31 DIAGNOSIS — A49.9 URINARY TRACT INFECTION, SITE NOT SPECIFIED: ICD-10-CM

## 2024-01-31 RX ORDER — CIPROFLOXACIN HYDROCHLORIDE 500 MG/1
500 TABLET, FILM COATED ORAL TWICE DAILY
Qty: 20 | Refills: 0 | Status: ACTIVE | COMMUNITY
Start: 2024-01-31 | End: 1900-01-01

## 2024-02-03 LAB — BACTERIA UR CULT: NORMAL

## 2024-02-06 ENCOUNTER — APPOINTMENT (OUTPATIENT)
Dept: UROLOGY | Facility: CLINIC | Age: 74
End: 2024-02-06
Payer: MEDICAID

## 2024-02-06 VITALS
HEIGHT: 66 IN | WEIGHT: 169 LBS | OXYGEN SATURATION: 96 % | HEART RATE: 85 BPM | BODY MASS INDEX: 27.16 KG/M2 | SYSTOLIC BLOOD PRESSURE: 138 MMHG | DIASTOLIC BLOOD PRESSURE: 65 MMHG | TEMPERATURE: 97.8 F | RESPIRATION RATE: 15 BRPM

## 2024-02-06 PROCEDURE — 99024 POSTOP FOLLOW-UP VISIT: CPT

## 2024-02-06 RX ORDER — CIPROFLOXACIN HYDROCHLORIDE 500 MG/1
500 TABLET, FILM COATED ORAL TWICE DAILY
Qty: 40 | Refills: 0 | Status: ACTIVE | COMMUNITY
Start: 2024-02-06 | End: 1900-01-01

## 2024-02-06 RX ORDER — PHENAZOPYRIDINE HYDROCHLORIDE 200 MG/1
200 TABLET ORAL
Qty: 10 | Refills: 0 | Status: ACTIVE | COMMUNITY
Start: 2024-02-06 | End: 1900-01-01

## 2024-02-20 ENCOUNTER — TELEPHONE (OUTPATIENT)
Dept: AUDIOLOGY | Facility: CLINIC | Age: 74
End: 2024-02-20
Payer: COMMERCIAL

## 2024-02-20 NOTE — TELEPHONE ENCOUNTER
[facility] Call Center    Phone Message    May a detailed message be left on voicemail: yes     Reason for Call: Symptoms or Concerns     If patient has red-flag symptoms, warm transfer to triage line    Current symptom or concern: Patients care coordinator over at [facility] is calling to speak to the audiology team regarding the last audiogram that was done in the clinic.    Visit on Oct 6th 2023,doctor had placed a note stating is a candidate for amplification, and insurance showed he would be self pay.    She is wondering if there is any insight into why it would be self pay and would like to discuss.       Action Taken: Other: AUDIOLOGY    Travel Screening: Not Applicable

## 2024-02-21 NOTE — TELEPHONE ENCOUNTER
[facility] Call Center    Phone Message    May a detailed message be left on voicemail: yes     Reason for Call: Other: Ilan from [facility] calling back to speak to [place]. Ilan spoke with [facility] and patient has [facility] plus so hearing aids is covered. Ilan would like a call back at [phone]. Thank you.    Action Taken: Message routed to:  Other: Audiology    Travel Screening: Not Applicable

## 2024-02-21 NOTE — TELEPHONE ENCOUNTER
Spoke with care coordinator. Let them know that a [employer] benefit was found and our clinic is not a [employer] provider so they would be self-pay at our clinic. The care coordinator had questions about the PMAP plan. It was reviewed that traditionally [facility] covers hearing aids when they are the primary insurance, but if they are the secondary then the coverage would likely default to the primary coverage of the [employer] Medicare. They were encouraged to contact the insurances directly with insurance coverage questions      Kalie Cadet  Audiologist  [place] License  #[id number]

## 2024-02-21 NOTE — TELEPHONE ENCOUNTER
Left VM for Ilan stating that the patient is welcome to return for a hearing aid consultation. Traditionally the [employer] Medicare plans have hearing aid benefits that may not be through our clinic. He is welcome to return for a consultation which can be scheduled through the main clinic number of [phone]. Our insurance team does check benefits prior to any consultation and so would verify any benefit he may have at that time. This provider does not know the specifics of the patient's insurance plan.    Kalie Cadet  Audiologist  [place] License  #[id number]

## 2024-02-28 NOTE — TELEPHONE ENCOUNTER
Coordinator wants to know if insurance can be run prior so the patient doesn't end up with a large bill, tried speaking with billing she stated they were extremely rude and denied her any information

## 2024-02-28 NOTE — TELEPHONE ENCOUNTER
Spoke with Ilan and reiterated that hearing aid benefits are checked prior to the hearing aid consultation. Because he was never actually billed for the hearing aids, the billing office would not be able to see or comment on coverage.  The best option for any clarification of coverage is to work directly with insurance as we cannot guarantee coverage.  She expressed understanding and will contact the patient to see if he wants to schedule the appointment again.    He would need to schedule:  Hearing aid consultation  Hearing aid fitting  Hearing aid initial review.    Kalie Cadet  Audiologist  [place] License  #[id number]

## 2024-03-06 ENCOUNTER — APPOINTMENT (OUTPATIENT)
Dept: UROLOGY | Facility: CLINIC | Age: 74
End: 2024-03-06
Payer: MEDICAID

## 2024-03-06 ENCOUNTER — TRANSCRIBE ORDERS (OUTPATIENT)
Dept: OTHER | Age: 74
End: 2024-03-06

## 2024-03-06 VITALS
HEART RATE: 76 BPM | DIASTOLIC BLOOD PRESSURE: 63 MMHG | RESPIRATION RATE: 15 BRPM | SYSTOLIC BLOOD PRESSURE: 131 MMHG | HEIGHT: 66 IN | OXYGEN SATURATION: 97 % | TEMPERATURE: 98 F | BODY MASS INDEX: 26.2 KG/M2 | WEIGHT: 163 LBS

## 2024-03-06 DIAGNOSIS — R39.9 UNSPECIFIED SYMPTOMS AND SIGNS INVOLVING THE GENITOURINARY SYSTEM: ICD-10-CM

## 2024-03-06 DIAGNOSIS — H83.3X1 NOISE-INDUCED HEARING LOSS OF RIGHT EAR, UNSPECIFIED HEARING STATUS ON CONTRALATERAL SIDE: Primary | ICD-10-CM

## 2024-03-06 PROCEDURE — 99024 POSTOP FOLLOW-UP VISIT: CPT

## 2024-03-07 PROBLEM — R39.9 LOWER URINARY TRACT SYMPTOMS (LUTS): Status: ACTIVE | Noted: 2023-11-01

## 2024-03-07 NOTE — HISTORY OF PRESENT ILLNESS
[FreeTextEntry1] : JONATHAN ALLISON Jan 1 1950  Language: Chinese Date of First visit: 11/01/2023 Accompanied by: Bayleeb: son by telephone [phone] Contact info: Referring Provider/PCP: Dr. Alix Arriola Fax: [phone]  CC/ Problem List: LUTS - urgency and frequency =============================================================================== FIRST VISIT / Summary: Very pleasant 73 year old M here for LUTS / Elevated PSA. He has some occasional burning, slow stream with intermittency usually voids 3 times (triple void) before he feels empty. He is somewhat constipated, about every other day taking tamsulosin and oxybutynin daily. Discussed stool softeners. He goes about every hour and sometimes has urge incontinence.  ------------------------------------------------------------------------------------------- INTERVAL VISITS: The patient's medications and allergies were reviewed and edited below. Dated 03/07/2024   Had dysuria / frequency primarily at night. Initially on Bactrim, changed to cipro after no response x5 days then extended. He reports pain finally improved after about 2 weeks abx. Now nearly normal, occasionally some urgency but not bad able to hold around 2 hrs between voids.  ===============================================================================  PMH: DM, HTN, HLD Meds: alogliptin-metformin, HCTZ, ramipril, metoprolol, All: nkda FHx: No  malignancies SocHx: never smoker, no etoh, retired  PSH: TURP approx 2018  ROS: Review of Systems is as per HPI unless otherwise denoted below  =============================================================================== DATA: LABS (SELECTED):--------------------------------------------------------------------------------------------------- 07/2018: PSA 11.5 per records from Dr. Couch 11/01/2023 PSA from Dr. Arriola 8.12 with 18% free 1/24/24: Urine cx with >100k Serratia marcescens R to penicillin and nitrofurantoin as well as cefazolin. S to bactrim and ciprofloxacin  RADS:-------------------------------------------------------------------------------------------------------------------   PATHOLOGY/CYTOLOGY:------------------------------------------------------------------------------------------- 2018 or 2019 prostate biopsy: per family report showed inflammation only. 1/18/2024: Prostate chips: 13g benign  VOIDING STUDIES: ---------------------------------------------------------------------------------------------------- 11/01/2023 PVR 51 11/13/23: UDS showing 260 capacity, no DO, coordinated void however prolonged, interrupted flow pattern with high pressure. 01/22/2024 Was only able to tolerate 100cc instillation, leaked with catheter removel. 20 min later Voided about 60cc PVR 27  STONE STUDIES: (Analysis/LLSA)----------------------------------------------------------------------------------   PROCEDURES: ----------------------------------------------------------------------------------------------- 12/13/2023 Cysto with 3cm prostate, bilateral hypertrophy from prostate regrowth 1/18/24: TURP  =============================================================================== PHYSICAL EXAM:   FOCUSED: ----------------------------------------------------------------------------------------------------------------   ======================================================================================= DISCUSSION: They are at increased risk of morbidity from additional testing and treatment that may be required based on the results of the above evaluation PSA >10 Severe LUTS - Based on the patient's findings as documented above, they decided to proceed with planned surgical management. Risk factors as identified in the history and assessment / plan. ======================================================================================= ASSESSMENT and PLAN  1. LUTS - mixed picture urge / obstructive symptoms - showed prostate regrowth now s/p TURP - dysuria now nearly normal, does have urgency. He stopped oxybutynin, explained can add back if urgency is too bothersome.  - discussed again kegel exercises  2. Elevated PSA - decreased from prior when he had negative biopsy - 2019 negative prostate biopsy per report  ======================================================================================= poa Thank you for allowing me to assist in the care of your patient. Should you have any questions please do not hesitate to reach out to me.   Adolfo Julian MD [facility] Physician [facility]  [place] Office: [address], [address] [place] [address] T: [phone] F: [phone]  [place] Office: [address] [place] [address], 1st [place] [address] T: [phone] F: [phone]

## 2024-03-27 ENCOUNTER — MEDICAL CORRESPONDENCE (OUTPATIENT)
Dept: HEALTH INFORMATION MANAGEMENT | Facility: CLINIC | Age: 74
End: 2024-03-27
Payer: COMMERCIAL

## 2024-03-28 ENCOUNTER — TRANSCRIBE ORDERS (OUTPATIENT)
Dept: OTHER | Age: 74
End: 2024-03-28

## 2024-03-28 DIAGNOSIS — M17.0 OSTEOARTHRITIS OF BOTH KNEES, UNSPECIFIED OSTEOARTHRITIS TYPE: Primary | ICD-10-CM

## 2024-03-29 ENCOUNTER — TRANSCRIBE ORDERS (OUTPATIENT)
Dept: OTHER | Age: 74
End: 2024-03-29

## 2024-03-29 DIAGNOSIS — M25.562 BILATERAL KNEE PAIN: Primary | ICD-10-CM

## 2024-03-29 DIAGNOSIS — M25.561 BILATERAL KNEE PAIN: Primary | ICD-10-CM

## 2024-06-03 ENCOUNTER — LAB REQUISITION (OUTPATIENT)
Dept: LAB | Facility: CLINIC | Age: 74
End: 2024-06-03
Payer: COMMERCIAL

## 2024-06-03 DIAGNOSIS — R30.9 PAINFUL MICTURITION, UNSPECIFIED: ICD-10-CM

## 2024-06-03 DIAGNOSIS — N40.1 BENIGN PROSTATIC HYPERPLASIA WITH LOWER URINARY TRACT SYMPTOMS: ICD-10-CM

## 2024-06-03 DIAGNOSIS — E78.5 HYPERLIPIDEMIA, UNSPECIFIED: ICD-10-CM

## 2024-06-03 PROCEDURE — 80048 BASIC METABOLIC PNL TOTAL CA: CPT | Mod: ORL | Performed by: INTERNAL MEDICINE

## 2024-06-03 PROCEDURE — 80061 LIPID PANEL: CPT | Mod: ORL | Performed by: INTERNAL MEDICINE

## 2024-06-03 PROCEDURE — 84153 ASSAY OF PSA TOTAL: CPT | Mod: ORL | Performed by: INTERNAL MEDICINE

## 2024-06-04 ENCOUNTER — LAB REQUISITION (OUTPATIENT)
Dept: LAB | Facility: CLINIC | Age: 74
End: 2024-06-04
Payer: COMMERCIAL

## 2024-06-04 DIAGNOSIS — Z12.11 ENCOUNTER FOR SCREENING FOR MALIGNANT NEOPLASM OF COLON: ICD-10-CM

## 2024-06-04 LAB
ANION GAP SERPL CALCULATED.3IONS-SCNC: 17 MMOL/L (ref 7–15)
BUN SERPL-MCNC: 17.2 MG/DL (ref 8–23)
CALCIUM SERPL-MCNC: 9.8 MG/DL (ref 8.8–10.2)
CHLORIDE SERPL-SCNC: 102 MMOL/L (ref 98–107)
CHOLEST SERPL-MCNC: 137 MG/DL
CREAT SERPL-MCNC: 1.14 MG/DL (ref 0.67–1.17)
DEPRECATED HCO3 PLAS-SCNC: 21 MMOL/L (ref 22–29)
EGFRCR SERPLBLD CKD-EPI 2021: 68 ML/MIN/1.73M2
FASTING STATUS PATIENT QL REPORTED: YES
FASTING STATUS PATIENT QL REPORTED: YES
GLUCOSE SERPL-MCNC: 99 MG/DL (ref 70–99)
HDLC SERPL-MCNC: 46 MG/DL
LDLC SERPL CALC-MCNC: 73 MG/DL
NONHDLC SERPL-MCNC: 91 MG/DL
POTASSIUM SERPL-SCNC: 3.8 MMOL/L (ref 3.4–5.3)
PSA SERPL DL<=0.01 NG/ML-MCNC: 2.05 NG/ML (ref 0–6.5)
SODIUM SERPL-SCNC: 140 MMOL/L (ref 135–145)
TRIGL SERPL-MCNC: 91 MG/DL

## 2024-06-04 PROCEDURE — 82274 ASSAY TEST FOR BLOOD FECAL: CPT | Mod: ORL | Performed by: INTERNAL MEDICINE

## 2024-06-05 LAB — HEMOCCULT STL QL IA: NEGATIVE

## 2024-06-06 ENCOUNTER — THERAPY VISIT (OUTPATIENT)
Dept: PHYSICAL THERAPY | Facility: CLINIC | Age: 74
End: 2024-06-06
Payer: COMMERCIAL

## 2024-06-06 DIAGNOSIS — M22.2X2 PATELLOFEMORAL ARTHRALGIA OF BOTH KNEES: Primary | ICD-10-CM

## 2024-06-06 DIAGNOSIS — M22.2X1 PATELLOFEMORAL ARTHRALGIA OF BOTH KNEES: Primary | ICD-10-CM

## 2024-06-06 PROCEDURE — 97161 PT EVAL LOW COMPLEX 20 MIN: CPT | Mod: GP

## 2024-06-06 PROCEDURE — 97110 THERAPEUTIC EXERCISES: CPT | Mod: GP

## 2024-06-06 NOTE — PROGRESS NOTES
PHYSICAL THERAPY EVALUATION  Type of Visit: Evaluation    See electronic medical record for Abuse and Falls Screening details.    Pt reports pain in B knees and no help with injections. Pt reports both knees hurt but right is worse. Pt states that walking, stairs and getting up from chairs are difficult. Sitting for long periods of time with knees in flexion is painful.       Patient goals for therapy:  get up from chair without arm support, walk without pain     Pain assessment:  Location: superficial and deep to patellae, entire knees B  Quality: dull  Worse with: standing up without arm support, walking, stairs are challenging  Better with: nothing   Pain at rest (0-10): 5  Pain at most painful (0-10): 7    Sleep Quality: difficulty sleeping, painful with changing positions, on side     History of falls: no     Subjective       Presenting condition or subjective complaint:    Date of onset: 06/06/24    Relevant medical history:     Dates & types of surgery:      Prior diagnostic imaging/testing results:       Prior therapy history for the same diagnosis, illness or injury:        Living Environment  Social support:     Type of home:     Stairs to enter the home:         Ramp:     Stairs inside the home:         Help at home:    Equipment owned:       Employment:      Hobbies/Interests:      Patient goals for therapy:      Pain assessment: see above      Objective     PALPATION: Not tender to palpation     POSTURE: WFL    GAIT:   Weightbearing Status: WBAT  Assistive Device(s): None  Gait Deviations: Antalgic    FUNCTIONAL MOBILITY:   Sit to Stand: Unable to complete without HHA  Stairs:   R: Able to complete without HHA, pelvic drop   L: able to complete without HHA, minimal deviatons  No pain with stairs   Quad set: weak B     RANGE OF MOTION:     (Degrees) AROM PROM    Left Right Left  Right   Knee Flexion WNL WNL     Knee Extension WNL WNL             SPECIAL TESTS:    Left Right   Apley's (Meniscus)      Adolph's (Meniscus)     Vicki's (ITB/TFL)     Patellar Apprehension Test     Patella Tracking     Ligamentous Stability     Anterior Drawer (ACL) Negative,   Negative,     Posterior Drawer (PCL) Negative,   Negative,     Prone Dial Test at 30 Deg and 90 Deg (PCL/PLC)     Valgus Stress Testing at 0 Deg and 30 Deg Positive,   Positive,     Varus Stress Testing at 0 Deg and 30 Deg  Positive,   Negative,         Assessment & Plan   CLINICAL IMPRESSIONS  Medical Diagnosis: OA of B knees    Treatment Diagnosis: B knee pain with patellofemoral pain type presentation   Impression/Assessment: Patient is a 74 year old male with complaints of B knee pain over entire surface of knees. He experiences pain with valgus stress testing bilaterally and has pain that is worsened typically by sitting for long periods of time and prolonged flexion of the knees.  The following significant findings have been identified: Pain and Decreased strength. These impairments interfere with their ability to perform self care tasks, household chores, household mobility, and community mobility as compared to previous level of function.     Clinical Decision Making (Complexity):  Clinical Presentation: Stable/Uncomplicated  Clinical Presentation Rationale: based on medical and personal factors listed in PT evaluation  Clinical Decision Making (Complexity): Low complexity    PLAN OF CARE  Treatment Interventions:  Interventions: Gait Training, Manual Therapy, Neuromuscular Re-education, Therapeutic Activity, Therapeutic Exercise    Long Term Goals     PT Goal 1  Goal Identifier: sit to stand  Goal Description: Pt will be able to complete sit to stand transfer without HHA and without pain.  Rationale: to maximize safety and independence with performance of ADLs and functional tasks  Goal Progress: progressing  Target Date: 09/03/24  PT Goal 2  Goal Identifier: walk  Goal Description: Pt will be able to ambulate for 5 minutes without worsening knee  pain.  Rationale: to maximize safety and independence with performance of ADLs and functional tasks;to maximize safety and independence within the community;to maximize safety and independence within the home  Goal Progress: progressing  Target Date: 09/03/24      Frequency of Treatment: every other week  Duration of Treatment: 90 days    Education Assessment:   Learner/Method: Patient  Education Comments: Pt kimberly to tx    Risks and benefits of evaluation/treatment have been explained.   Patient/Family/caregiver agrees with Plan of Care.     Evaluation Time:           Signing Clinician: Lori Ordoñez PT      [facility]                                                                                   OUTPATIENT PHYSICAL THERAPY      PLAN OF TREATMENT FOR OUTPATIENT REHABILITATION   Patient's Last Name, First Name, Jossue Ch YOB: 1950   Provider's Name   [facility]   Medical Record No.  [id number]     Onset Date: 06/06/24  Start of Care Date: 06/06/24     Medical Diagnosis:  OA of B knees      PT Treatment Diagnosis:  B knee pain with patellofemoral pain type presentation Plan of Treatment  Frequency/Duration: every other week/ 90 days    Certification date from 06/06/24 to 09/03/24         See note for plan of treatment details and functional goals     Lori Ordoñez, PT                         I CERTIFY THE NEED FOR THESE SERVICES FURNISHED UNDER        THIS PLAN OF TREATMENT AND WHILE UNDER MY CARE .             Physician Signature               Date    X_____________________________________________________                  Referring Provider:  Gladys Fitzgerald    Initial Assessment  See Epic Evaluation- Start of Care Date: 06/06/24

## 2024-06-14 DIAGNOSIS — M25.562 BILATERAL KNEE PAIN: Primary | ICD-10-CM

## 2024-06-14 DIAGNOSIS — M25.561 BILATERAL KNEE PAIN: Primary | ICD-10-CM

## 2024-06-18 ENCOUNTER — PRE VISIT (OUTPATIENT)
Dept: ORTHOPEDICS | Facility: CLINIC | Age: 74
End: 2024-06-18

## 2024-06-18 NOTE — TELEPHONE ENCOUNTER
DIAGNOSIS: NATALIE knee pain/[facility] clinic/[facility]/ortho con  Please use iPad or phones [phone] to reach  and follow prompts     APPOINTMENT DATE: 6.18.24   NOTES STATUS DETAILS   OFFICE NOTE from referring provider CE 3.27.24, 2.5.24  Amilcar  [facility]   OFFICE NOTE from other specialist CE 2.15.24, 11.28.23, 3.16.21  Juan  [facility]    11.13.23, 8.15.22  Alba  [facility]    6.2.22  Kosta  [facility]   MEDICATION LIST Internal    MRI Internal 2.22.21  MR Knee Left   XRAYS (IMAGES & REPORTS) In Process *sched* for 6.18.24  XR Knee Natalie

## 2024-06-20 ENCOUNTER — THERAPY VISIT (OUTPATIENT)
Dept: PHYSICAL THERAPY | Facility: CLINIC | Age: 74
End: 2024-06-20
Payer: COMMERCIAL

## 2024-06-20 DIAGNOSIS — M22.2X1 PATELLOFEMORAL ARTHRALGIA OF BOTH KNEES: Primary | ICD-10-CM

## 2024-06-20 DIAGNOSIS — M22.2X2 PATELLOFEMORAL ARTHRALGIA OF BOTH KNEES: Primary | ICD-10-CM

## 2024-06-20 PROCEDURE — 97110 THERAPEUTIC EXERCISES: CPT | Mod: GP

## 2024-07-16 DIAGNOSIS — M25.562 PAIN IN BOTH KNEES, UNSPECIFIED CHRONICITY: Primary | ICD-10-CM

## 2024-07-16 DIAGNOSIS — M25.561 PAIN IN BOTH KNEES, UNSPECIFIED CHRONICITY: Primary | ICD-10-CM

## 2024-08-09 ENCOUNTER — ANCILLARY PROCEDURE (OUTPATIENT)
Dept: GENERAL RADIOLOGY | Facility: CLINIC | Age: 74
End: 2024-08-09
Attending: FAMILY MEDICINE
Payer: COMMERCIAL

## 2024-08-09 DIAGNOSIS — M25.561 PAIN IN BOTH KNEES, UNSPECIFIED CHRONICITY: ICD-10-CM

## 2024-08-09 DIAGNOSIS — M25.562 PAIN IN BOTH KNEES, UNSPECIFIED CHRONICITY: ICD-10-CM

## 2024-08-09 PROCEDURE — 73562 X-RAY EXAM OF KNEE 3: CPT | Mod: LT | Performed by: RADIOLOGY

## 2024-08-19 ENCOUNTER — LAB REQUISITION (OUTPATIENT)
Dept: LAB | Facility: CLINIC | Age: 74
End: 2024-08-19
Payer: COMMERCIAL

## 2024-08-19 DIAGNOSIS — E87.6 HYPOKALEMIA: ICD-10-CM

## 2024-08-19 LAB
ANION GAP SERPL CALCULATED.3IONS-SCNC: 12 MMOL/L (ref 7–15)
BUN SERPL-MCNC: 11.5 MG/DL (ref 8–23)
CALCIUM SERPL-MCNC: 9.2 MG/DL (ref 8.8–10.4)
CHLORIDE SERPL-SCNC: 101 MMOL/L (ref 98–107)
CREAT SERPL-MCNC: 1.21 MG/DL (ref 0.67–1.17)
EGFRCR SERPLBLD CKD-EPI 2021: 64 ML/MIN/1.73M2
GLUCOSE SERPL-MCNC: 89 MG/DL (ref 70–99)
HCO3 SERPL-SCNC: 25 MMOL/L (ref 22–29)
POTASSIUM SERPL-SCNC: 4.2 MMOL/L (ref 3.4–5.3)
SODIUM SERPL-SCNC: 138 MMOL/L (ref 135–145)

## 2024-08-19 PROCEDURE — 80048 BASIC METABOLIC PNL TOTAL CA: CPT | Mod: ORL | Performed by: INTERNAL MEDICINE

## 2024-09-18 ENCOUNTER — APPOINTMENT (OUTPATIENT)
Age: 74
End: 2024-09-18
Payer: MEDICAID

## 2024-09-18 VITALS
SYSTOLIC BLOOD PRESSURE: 127 MMHG | RESPIRATION RATE: 15 BRPM | BODY MASS INDEX: 27.32 KG/M2 | HEART RATE: 64 BPM | DIASTOLIC BLOOD PRESSURE: 64 MMHG | WEIGHT: 170 LBS | TEMPERATURE: 98.3 F | OXYGEN SATURATION: 96 % | HEIGHT: 66 IN

## 2024-09-18 DIAGNOSIS — N39.0 URINARY TRACT INFECTION, SITE NOT SPECIFIED: ICD-10-CM

## 2024-09-18 DIAGNOSIS — R39.9 UNSPECIFIED SYMPTOMS AND SIGNS INVOLVING THE GENITOURINARY SYSTEM: ICD-10-CM

## 2024-09-18 DIAGNOSIS — A49.9 URINARY TRACT INFECTION, SITE NOT SPECIFIED: ICD-10-CM

## 2024-09-18 PROCEDURE — 99213 OFFICE O/P EST LOW 20 MIN: CPT

## 2024-09-18 PROCEDURE — G2211 COMPLEX E/M VISIT ADD ON: CPT | Mod: NC

## 2024-09-18 RX ORDER — VIBEGRON 75 MG/1
75 TABLET, FILM COATED ORAL
Qty: 90 | Refills: 3 | Status: ACTIVE | COMMUNITY
Start: 2024-09-18 | End: 1900-01-01

## 2024-09-18 NOTE — HISTORY OF PRESENT ILLNESS
[FreeTextEntry1] : JONATHAN ALLISON Jan 1 1950  Language: Belarusian / english Date of First visit: 11/01/2023 Accompanied by: Bayleeb: son by telephone [phone] Contact info: Referring Provider/PCP: Dr. Alix Arriola Fax: [phone]   CC/ Problem List: LUTS - urgency and frequency =============================================================================== FIRST VISIT / Summary: Very pleasant 73 year old M here for LUTS / Elevated PSA. He has some occasional burning, slow stream with intermittency usually voids 3 times (triple void) before he feels empty. He is somewhat constipated, about every other day taking tamsulosin and oxybutynin daily. Discussed stool softeners. He goes about every hour and sometimes has urge incontinence.  ------------------------------------------------------------------------------------------- INTERVAL VISITS: The patient's medications and allergies were reviewed and edited below.  09/18/2024  Had dysuria / frequency primarily at night. He reports pain finally improved after about 2 weeks abx. Now nearly normal, occasionally some urgency but not bad able to hold around 2 hrs between voids still. Doing the exercises but not consistently 3-4 times per day.   ===============================================================================  PMH: DM, HTN, HLD Meds: alogliptin-metformin, HCTZ, ramipril, metoprolol, All: nkda FHx: No  malignancies SocHx: never smoker, no etoh, retired  PSH: WANG approx 2018  ROS: Review of Systems is as per HPI unless otherwise denoted below  =============================================================================== DATA: LABS (SELECTED):--------------------------------------------------------------------------------------------------- 07/2018: PSA 11.5 per records from Dr. Couch 11/01/2023 PSA from Dr. Arriola 8.12 with 18% free 1/24/24: Urine cx with >100k Serratia marcescens R to penicillin and nitrofurantoin as well as cefazolin. S to bactrim and ciprofloxacin   RADS:-------------------------------------------------------------------------------------------------------------------   PATHOLOGY/CYTOLOGY:------------------------------------------------------------------------------------------- 2018 or 2019 prostate biopsy: per family report showed inflammation only. 1/18/2024: Prostate chips: 13g benign  VOIDING STUDIES: ---------------------------------------------------------------------------------------------------- 11/01/2023 PVR 51 11/13/23: UDS showing 260 capacity, no DO, coordinated void however prolonged, interrupted flow pattern with high pressure. 01/22/2024 Was only able to tolerate 100cc instillation, leaked with catheter removel. 20 min later Voided about 60cc PVR 27  STONE STUDIES: (Analysis/LLSA)----------------------------------------------------------------------------------   PROCEDURES: ----------------------------------------------------------------------------------------------- 12/13/2023 Cysto with 3cm prostate, bilateral hypertrophy from prostate regrowth 1/18/24: TURP  =============================================================================== PHYSICAL EXAM:   FOCUSED: ----------------------------------------------------------------------------------------------------------------   ======================================================================================= DISCUSSION: They are at increased risk of morbidity from additional testing and treatment that may be required based on the results of the above evaluation PSA >10 Severe LUTS - Based on the patient's findings as documented above, they decided to proceed with planned surgical management. Risk factors as identified in the history and assessment / plan.  ======================================================================================= ASSESSMENT and PLAN  1. LUTS - mixed picture urge / obstructive symptoms - showed prostate regrowth now s/p TURP - dysuria now nearly normal, does have urgency. He wishes to try gemtesa. Trial given - discussed again kegel exercises. Offered [facility], he prefers at home for now. - RTC 1-2 med check  2. Elevated PSA - decreased from prior when he had negative biopsy - 2019 negative prostate biopsy per report  ======================================================================================= 3g Thank you for allowing me to assist in the care of your patient. Should you have any questions please do not hesitate to reach out to me.   Adolfo Julian MD [facility] Physician [facility]  [place] Office: [address], [address] [place] [address] T: [phone] F: [phone]  [place] Office: [address] [place] [address], 1st [place] [address] T: [phone] F: [phone]

## 2024-09-20 LAB — BACTERIA UR CULT: NORMAL

## 2024-11-19 ENCOUNTER — APPOINTMENT (OUTPATIENT)
Age: 74
End: 2024-11-19
Payer: MEDICAID

## 2024-11-19 VITALS
DIASTOLIC BLOOD PRESSURE: 63 MMHG | HEART RATE: 76 BPM | RESPIRATION RATE: 15 BRPM | WEIGHT: 172 LBS | TEMPERATURE: 97.9 F | SYSTOLIC BLOOD PRESSURE: 147 MMHG | BODY MASS INDEX: 27.64 KG/M2 | HEIGHT: 66 IN | OXYGEN SATURATION: 96 %

## 2024-11-19 DIAGNOSIS — R97.20 ELEVATED PROSTATE, SPECIFIC ANTIGEN [PSA]: ICD-10-CM

## 2024-11-19 DIAGNOSIS — R39.9 UNSPECIFIED SYMPTOMS AND SIGNS INVOLVING THE GENITOURINARY SYSTEM: ICD-10-CM

## 2024-11-19 PROCEDURE — G2211 COMPLEX E/M VISIT ADD ON: CPT | Mod: NC

## 2024-11-19 PROCEDURE — 99213 OFFICE O/P EST LOW 20 MIN: CPT

## 2024-11-19 RX ORDER — MIRABEGRON 50 MG/1
50 TABLET, EXTENDED RELEASE ORAL DAILY
Qty: 30 | Refills: 3 | Status: ACTIVE | COMMUNITY
Start: 2024-11-19 | End: 1900-01-01

## 2024-11-20 LAB — PSA SERPL-MCNC: 4.04 NG/ML

## 2024-12-10 ENCOUNTER — DOCUMENTATION ONLY (OUTPATIENT)
Dept: PHYSICAL THERAPY | Facility: CLINIC | Age: 74
End: 2024-12-10
Payer: COMMERCIAL

## 2024-12-10 DIAGNOSIS — M22.2X1 PATELLOFEMORAL ARTHRALGIA OF BOTH KNEES: Primary | ICD-10-CM

## 2024-12-10 DIAGNOSIS — M22.2X2 PATELLOFEMORAL ARTHRALGIA OF BOTH KNEES: Primary | ICD-10-CM

## 2025-02-21 ENCOUNTER — LAB REQUISITION (OUTPATIENT)
Dept: LAB | Facility: CLINIC | Age: 75
End: 2025-02-21
Payer: COMMERCIAL

## 2025-02-21 ENCOUNTER — MEDICAL CORRESPONDENCE (OUTPATIENT)
Dept: HEALTH INFORMATION MANAGEMENT | Facility: CLINIC | Age: 75
End: 2025-02-21

## 2025-02-21 DIAGNOSIS — R53.83 OTHER FATIGUE: ICD-10-CM

## 2025-02-21 DIAGNOSIS — R51.9 HEADACHE, UNSPECIFIED: ICD-10-CM

## 2025-02-21 LAB
ALBUMIN SERPL BCG-MCNC: 4.2 G/DL (ref 3.5–5.2)
ALP SERPL-CCNC: 70 U/L (ref 40–150)
ALT SERPL W P-5'-P-CCNC: 18 U/L (ref 0–70)
ANION GAP SERPL CALCULATED.3IONS-SCNC: 16 MMOL/L (ref 7–15)
AST SERPL W P-5'-P-CCNC: 23 U/L (ref 0–45)
BILIRUB SERPL-MCNC: 0.4 MG/DL
BUN SERPL-MCNC: 13.4 MG/DL (ref 8–23)
CALCIUM SERPL-MCNC: 9.4 MG/DL (ref 8.8–10.4)
CHLORIDE SERPL-SCNC: 97 MMOL/L (ref 98–107)
CREAT SERPL-MCNC: 1.02 MG/DL (ref 0.67–1.17)
EGFRCR SERPLBLD CKD-EPI 2021: 78 ML/MIN/1.73M2
GLUCOSE SERPL-MCNC: 94 MG/DL (ref 70–99)
HCO3 SERPL-SCNC: 26 MMOL/L (ref 22–29)
POTASSIUM SERPL-SCNC: 2.8 MMOL/L (ref 3.4–5.3)
PROT SERPL-MCNC: 7.1 G/DL (ref 6.4–8.3)
SODIUM SERPL-SCNC: 139 MMOL/L (ref 135–145)

## 2025-02-21 PROCEDURE — 80053 COMPREHEN METABOLIC PANEL: CPT | Mod: ORL | Performed by: FAMILY MEDICINE

## 2025-02-27 ENCOUNTER — LAB REQUISITION (OUTPATIENT)
Dept: LAB | Facility: CLINIC | Age: 75
End: 2025-02-27
Payer: COMMERCIAL

## 2025-02-27 DIAGNOSIS — E87.6 HYPOKALEMIA: ICD-10-CM

## 2025-02-27 LAB — MAGNESIUM SERPL-MCNC: 2.1 MG/DL (ref 1.7–2.3)

## 2025-02-27 PROCEDURE — 83735 ASSAY OF MAGNESIUM: CPT | Mod: ORL | Performed by: PEDIATRICS

## 2025-04-16 ENCOUNTER — APPOINTMENT (OUTPATIENT)
Age: 75
End: 2025-04-16
Payer: MEDICAID

## 2025-04-16 VITALS
RESPIRATION RATE: 15 BRPM | OXYGEN SATURATION: 95 % | HEART RATE: 76 BPM | HEIGHT: 66 IN | SYSTOLIC BLOOD PRESSURE: 149 MMHG | BODY MASS INDEX: 27 KG/M2 | DIASTOLIC BLOOD PRESSURE: 58 MMHG | TEMPERATURE: 96.3 F | WEIGHT: 168 LBS

## 2025-04-16 DIAGNOSIS — R97.20 ELEVATED PROSTATE, SPECIFIC ANTIGEN [PSA]: ICD-10-CM

## 2025-04-16 DIAGNOSIS — R39.9 UNSPECIFIED SYMPTOMS AND SIGNS INVOLVING THE GENITOURINARY SYSTEM: ICD-10-CM

## 2025-04-16 PROCEDURE — G2211 COMPLEX E/M VISIT ADD ON: CPT | Mod: NC

## 2025-04-16 PROCEDURE — 99214 OFFICE O/P EST MOD 30 MIN: CPT

## 2025-04-17 LAB
APPEARANCE: CLEAR
BACTERIA: NEGATIVE /HPF
BILIRUBIN URINE: NEGATIVE
BLOOD URINE: NEGATIVE
CAST: 0 /LPF
COLOR: YELLOW
EPITHELIAL CELLS: 2 /HPF
GLUCOSE QUALITATIVE U: >=1000 MG/DL
KETONES URINE: NEGATIVE MG/DL
LEUKOCYTE ESTERASE URINE: ABNORMAL
MICROSCOPIC-UA: NORMAL
NITRITE URINE: NEGATIVE
PH URINE: 5.5
PROTEIN URINE: NEGATIVE MG/DL
RED BLOOD CELLS URINE: 0 /HPF
SPECIFIC GRAVITY URINE: 1.02
UROBILINOGEN URINE: 0.2 MG/DL
WHITE BLOOD CELLS URINE: 2 /HPF

## 2025-04-18 LAB — BACTERIA UR CULT: NORMAL

## 2025-04-23 ENCOUNTER — OFFICE VISIT (OUTPATIENT)
Dept: OPHTHALMOLOGY | Facility: CLINIC | Age: 75
End: 2025-04-23
Attending: OPHTHALMOLOGY
Payer: COMMERCIAL

## 2025-04-23 DIAGNOSIS — Z96.1 PSEUDOPHAKIA, BOTH EYES: Primary | ICD-10-CM

## 2025-04-23 DIAGNOSIS — H52.4 MYOPIA OF BOTH EYES WITH ASTIGMATISM AND PRESBYOPIA: ICD-10-CM

## 2025-04-23 DIAGNOSIS — H52.203 MYOPIA OF BOTH EYES WITH ASTIGMATISM AND PRESBYOPIA: ICD-10-CM

## 2025-04-23 DIAGNOSIS — H52.13 MYOPIA OF BOTH EYES WITH ASTIGMATISM AND PRESBYOPIA: ICD-10-CM

## 2025-04-23 DIAGNOSIS — H11.153 PINGUECULA OF BOTH EYES: ICD-10-CM

## 2025-04-23 PROCEDURE — G0463 HOSPITAL OUTPT CLINIC VISIT: HCPCS | Performed by: OPHTHALMOLOGY

## 2025-04-23 PROCEDURE — 92014 COMPRE OPH EXAM EST PT 1/>: CPT | Performed by: OPHTHALMOLOGY

## 2025-04-23 ASSESSMENT — CONF VISUAL FIELD
OS_NORMAL: 1
OS_SUPERIOR_TEMPORAL_RESTRICTION: 0
OD_INFERIOR_NASAL_RESTRICTION: 0
OS_INFERIOR_NASAL_RESTRICTION: 0
OS_SUPERIOR_NASAL_RESTRICTION: 0
OD_SUPERIOR_NASAL_RESTRICTION: 0
OS_INFERIOR_TEMPORAL_RESTRICTION: 0
OD_NORMAL: 1
OD_SUPERIOR_TEMPORAL_RESTRICTION: 0
OD_INFERIOR_TEMPORAL_RESTRICTION: 0

## 2025-04-23 ASSESSMENT — TONOMETRY
OS_IOP_MMHG: 10
IOP_METHOD: ICARE
OD_IOP_MMHG: 12

## 2025-04-23 ASSESSMENT — SLIT LAMP EXAM - LIDS
COMMENTS: NORMAL
COMMENTS: NORMAL

## 2025-04-23 ASSESSMENT — CUP TO DISC RATIO
OD_RATIO: 0.1
OS_RATIO: 0.2

## 2025-04-23 ASSESSMENT — REFRACTION_MANIFEST
OS_ADD: +2.75
OS_SPHERE: -1.00
OD_SPHERE: -0.75
OD_CYLINDER: SPHERE
OS_CYLINDER: +0.25
OS_AXIS: 040
OD_ADD: +2.75

## 2025-04-23 ASSESSMENT — VISUAL ACUITY
OS_SC: 20/20
OD_SC: 20/25
OS_SC+: -2
OD_SC+: -2
METHOD: SNELLEN - LINEAR

## 2025-04-23 ASSESSMENT — REFRACTION_WEARINGRX
OS_ADD: +2.75
OS_CYLINDER: SPHERE
SPECS_TYPE: SVL/BF/PAL
OD_ADD: +2.75
OS_SPHERE: -0.75
OD_SPHERE: -1.00
OD_CYLINDER: SPHERE

## 2025-04-23 NOTE — PROGRESS NOTES
HPI       Annual Eye Exam    In both eyes.  This started 1 year ago.             Comments    Pt. States that he is doing well. No change in VA BE. No pain BE. No flashes or floaters BE.   Juanita Hernadez COT 9:08 AM April 23, 2025             Last edited by Juanita Hernadez on 4/23/2025  9:08 AM.         Review of systems for the eyes was negative other than the pertinent positives/negatives listed in the HPI.      Assessment & Plan    HPI:  Jossue Hickey is a 75 year old male with history of BPH, HLD, HTN, GERD, allergies pseudophakia presents for complete exam. Notes occasioanl Foreign body sensation od      POHx: pseudophakia, myopia with astigmatism and presbyopia    PMHx: BPH, HLD, HTN, GERD, allergies   Current Medications:   Current Outpatient Medications   Medication Sig Dispense Refill    ACETAMINOPHEN EXTRA STRENGTH 500 MG tablet TAKE 2 TABLETS BY MOUTH EVERY 6 HOURS AS NEEDED FOR PAIN.      allopurinol (ZYLOPRIM) 100 MG tablet       atorvastatin (LIPITOR) 80 MG tablet Take 80 mg by mouth daily      atovaquone-proguanil (MALARONE) 250-100 MG tablet Take 1 tablet by mouth daily      diclofenac (VOLTAREN) 1 % topical gel Apply 2 g topically      famotidine (PEPCID) 20 MG tablet Take 20 mg by mouth      gabapentin (NEURONTIN) 100 MG capsule Take 200 mg by mouth      hydrochlorothiazide (HYDRODIURIL) 25 MG tablet TAKE 1 TABLET BY MOUTH ONCE DAILY FOR HIGH BLOOD PRESSURE      ketorolac (ACULAR) 0.5 % ophthalmic solution Place 1 drop into the right eye 4 times daily 5 mL 0    KLOR-CON 20 MEQ CR tablet Take 20 mEq by mouth      loratadine (CLARITIN) 10 MG tablet Take 10 mg by mouth      multivitamin w/minerals (MULTI-VITAMIN) tablet Take 1 tablet by mouth daily      omeprazole (PRILOSEC) 20 MG DR capsule Take 20 mg by mouth daily      tamsulosin (FLOMAX) 0.4 MG capsule Take 1 capsule by mouth daily       No current facility-administered medications for this visit.     FHx:  PSHx: Cataract extraction/intraocular lens  both eyes 2023 Gaby      Current Eye Medications:      Assessment & Plan:  (Z96.1) Pseudophakia, both eyes  (primary encounter diagnosis)  Gaby 2023    (H52.13,  H52.203,  H52.4) Myopia of both eyes with astigmatism and presbyopia  Patient has minimal change in myopia but a copy of today's glasses prescription was given.  The patient may wish to update the glasses if the lenses are scratched or the frames are too small.  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        Giancarlo Makr MD     Attending Physician Attestation:  Complete documentation of historical and exam elements from today's encounter can be found in the full encounter summary report (not reduplicated in this progress note).  I personally obtained the chief complaint(s) and history of present illness.  I confirmed and edited as necessary the review of systems, past medical/surgical history, family history, social history, and examination findings as documented by others; and I examined the patient myself.  I personally reviewed the relevant tests, images, and reports as documented above.  I formulated and edited as necessary the assessment and plan and discussed the findings and management plan with the patient and family. - Giancarlo Mark MD

## 2025-04-23 NOTE — NURSING NOTE
Chief Complaints and History of Present Illnesses   Patient presents with    Annual Eye Exam     Chief Complaint(s) and History of Present Illness(es)       Annual Eye Exam              Laterality: both eyes    Onset: 1 year ago              Comments    Pt. States that he is doing well. No change in VA BE. No pain BE. No flashes or floaters BE.   Juanita Hernadez COT 9:08 AM April 23, 2025

## 2025-04-23 NOTE — PATIENT INSTRUCTIONS
"Use one drop of artificial tears both eyes 3-4 x daily.  Continue to use the drops regardless if your eyes are comfortable.  Artificial tears work best as a preventative and not as well after your eyes are starting to bother you.  Preservative-free drops are best if you will be using them more than 6x daily. Some brands include: Celluvisc, Refresh, Systane, Blink, Optive, iVizia. Avoid using any drops that state \"get the red out\". Also, use lubricating artificial tear ointment at bedtime in both eyes every night.  Genteal and Refresh PM are preservative-free; generic brands and Lacrilube are not.    It may take 4-6 weeks of using the drops before you notice improvement.  If after that time you are still having problems schedule an appointment for an evaluation and discussion of different treatments which may include medicated eye drops, punctal plugs to keep the tears that are made and supplemented on the surface of the eye longer, or other treatments. Dry eyes are a chronic condition and you may have more symptoms at certain times of the year.     Discussed UV protection for prevention and using artificial tears for symptomatic relief  May require steroid drops or surgical excision if inflamed or inducing significant corneal astigmatism    "

## 2025-04-30 ENCOUNTER — OFFICE VISIT (OUTPATIENT)
Dept: AUDIOLOGY | Facility: CLINIC | Age: 75
End: 2025-04-30
Payer: COMMERCIAL

## 2025-04-30 DIAGNOSIS — H90.3 BILATERAL SENSORINEURAL HEARING LOSS: Primary | ICD-10-CM

## 2025-04-30 NOTE — PROGRESS NOTES
AUDIOLOGY REPORT    SUBJECTIVE: Jossue Hickey is a 75 year old male was seen in the Audiology Clinic at  [facility] on 4/30/25 to discuss concerns with hearing and functional communication difficulties. The patient was accompanied by a . Jossue has been seen previously on 3/7/25, and results revealed a mild sloping to moderately-severe sensorineural hearing loss in the left ear and a mild sloping to severe sensorineural hearing loss in the right ear. The patient was not yet medically evaluated and determined to be cleared for amplification. Jossue notes difficulty with communication in a variety of listening situations, in particular conversations and background noise.    OBJECTIVE: Patient is a hearing aid candidate. Patient would like to move forward with a hearing aid evaluation today. Therefore, the patient was presented with different options for amplification to help aid in communication. Discussed styles, levels of technology and monaural vs. binaural fitting. Discussed disposable vs rechargeable batteries and connectivity. Patient would like to keep things automatic, but also stream to his iPhone. He would also prefer rechargeable batteries.     The hearing aid(s) mutually chosen were:  Binaural: Oticon Real 2  COLOR: Black   BATTERY SIZE: rechargeable  EARMOLD/TIPS: 10 mm closed   CANAL/ LENGTH: 2    Discussed that patient will need medical clearance by physician prior to hearing aid fitting. Discussed this could be from primary care provider or ENT. Patient would like to take form to primary care provider's office. Elected to schedule ENT appointment on same day as fitting in case patient has any issues with clear form so fitting would not be delayed.     ASSESSMENT: Reviewed purchase information and warranty information with patient. The 45 day trial period was explained to patient. The patient was given a copy of the [place]  Department of Health consumer brochure on purchasing hearing instruments. Patient risk factors have been provided to the patient in writing prior to the sale of the hearing aid per FDA regulation. The risk factors are also available in the User Instructional Booklet to be presented on the day of the hearing aid fitting. Hearing aid(s) ordered. Hearing aid evaluation completed.    PLAN: Jossue is scheduled to return in a few weeks for a hearing aid fitting and programming. Purchase agreement will be completed on that date. Prior to this, patient will follow up with ENT or primary care provider for medical clearance per insurance requirement. Please contact this clinic with any questions or concerns.      Harley Allan. CCC-A  Licensed Audiologist   [place] #[id number]

## 2025-05-08 ENCOUNTER — MEDICAL CORRESPONDENCE (OUTPATIENT)
Dept: HEALTH INFORMATION MANAGEMENT | Facility: CLINIC | Age: 75
End: 2025-05-08

## 2025-05-08 ENCOUNTER — APPOINTMENT (OUTPATIENT)
Dept: OPTOMETRY | Facility: CLINIC | Age: 75
End: 2025-05-08
Payer: COMMERCIAL

## 2025-05-08 PROCEDURE — 92341 FIT SPECTACLES BIFOCAL: CPT | Performed by: OPTOMETRIST

## 2025-05-09 ENCOUNTER — APPOINTMENT (OUTPATIENT)
Dept: INTERPRETER SERVICES | Facility: CLINIC | Age: 75
End: 2025-05-09
Payer: COMMERCIAL

## 2025-05-13 ENCOUNTER — OFFICE VISIT (OUTPATIENT)
Dept: ORTHOPEDICS | Facility: CLINIC | Age: 75
End: 2025-05-13
Attending: INTERNAL MEDICINE
Payer: COMMERCIAL

## 2025-05-13 ENCOUNTER — VIRTUAL VISIT (OUTPATIENT)
Dept: INTERPRETER SERVICES | Facility: CLINIC | Age: 75
End: 2025-05-13

## 2025-05-13 DIAGNOSIS — M17.0 BILATERAL PRIMARY OSTEOARTHRITIS OF KNEE: Primary | ICD-10-CM

## 2025-05-13 PROCEDURE — T1013 SIGN LANG/ORAL INTERPRETER: HCPCS | Mod: U4

## 2025-05-13 PROCEDURE — 99204 OFFICE O/P NEW MOD 45 MIN: CPT | Mod: 25 | Performed by: FAMILY MEDICINE

## 2025-05-13 PROCEDURE — 1125F AMNT PAIN NOTED PAIN PRSNT: CPT | Performed by: FAMILY MEDICINE

## 2025-05-13 PROCEDURE — 20610 DRAIN/INJ JOINT/BURSA W/O US: CPT | Mod: 50 | Performed by: FAMILY MEDICINE

## 2025-05-13 RX ORDER — TRIAMCINOLONE ACETONIDE 40 MG/ML
40 INJECTION, SUSPENSION INTRA-ARTICULAR; INTRAMUSCULAR
Status: COMPLETED | OUTPATIENT
Start: 2025-05-13 | End: 2025-05-13

## 2025-05-13 RX ORDER — LIDOCAINE HYDROCHLORIDE 10 MG/ML
4 INJECTION, SOLUTION EPIDURAL; INFILTRATION; INTRACAUDAL; PERINEURAL
Status: COMPLETED | OUTPATIENT
Start: 2025-05-13 | End: 2025-05-13

## 2025-05-13 RX ADMIN — TRIAMCINOLONE ACETONIDE 40 MG: 40 INJECTION, SUSPENSION INTRA-ARTICULAR; INTRAMUSCULAR at 09:01

## 2025-05-13 RX ADMIN — LIDOCAINE HYDROCHLORIDE 4 ML: 10 INJECTION, SOLUTION EPIDURAL; INFILTRATION; INTRACAUDAL; PERINEURAL at 09:01

## 2025-05-13 NOTE — LETTER
5/13/2025      RE: Jossue Hickey  [address]  [facility] [address]     Dear Colleague,    Thank you for referring your patient, Jossue Hickey, to the [facility] [place]. Please see a copy of my visit note below.    CHIEF COMPLAINT:  Consult (Bilateral knees)     HISTORY OF PRESENT ILLNESS  Mr. Hickey is a pleasant 75 year old year old male who presents to clinic today with bilateral knee pain.  Jossue explains that he had insidious onset of bilateral knee pain several years ago without injury.     He was initially seen and evaluated in 2024 for his knees were x-rays were performed in August 2024.  He has been prescribed physical therapy with no relief.  He has tried bracing with no relief.  He ambulates with a cane which he finds quite helpful.  Is also no relief with OTC analgesics including Tylenol and Voltaren gel.  He was referred on by his PCP for ongoing pain despite a number of treatment measures above.    He has swelling of the knees.  Pain with walking, stairs.  Improved with rest.    Onset: gradual  Location: bilateral knee  Quality:  aching, dull, and sharp  Duration: Several years  Severity: 10/10 at worst  Timing:constant  Modifying factors:  resting and non-use makes it better, movement and use makes it worse  Associated signs & symptoms: pain  Previous similar pain: Yes  Treatments to date: Medication    Additional history: as documented    Review of Systems:  Have you recently had a a fever, chills, weight loss? No  Do you have any vision problems? No  Do you have any chest pain or edema? No  Do you have any shortness of breath or wheezing?  No  Do you have stomach problems? No  Do you have any numbness or focal weakness? No  Do you have diabetes? No  Do you have problems with bleeding or clotting? No  Do you have an rashes or other skin lesions? No    MEDICAL HISTORY  Patient Active Problem List   Diagnosis     Chronic bilateral low back pain  with bilateral sciatica     Combined form of age-related cataract, both eyes     Patellofemoral arthralgia of both knees       Current Outpatient Medications   Medication Sig Dispense Refill     ACETAMINOPHEN EXTRA STRENGTH 500 MG tablet TAKE 2 TABLETS BY MOUTH EVERY 6 HOURS AS NEEDED FOR PAIN.       allopurinol (ZYLOPRIM) 100 MG tablet        atorvastatin (LIPITOR) 80 MG tablet Take 80 mg by mouth daily       atovaquone-proguanil (MALARONE) 250-100 MG tablet Take 1 tablet by mouth daily       diclofenac (VOLTAREN) 1 % topical gel Apply 2 g topically       famotidine (PEPCID) 20 MG tablet Take 20 mg by mouth       gabapentin (NEURONTIN) 100 MG capsule Take 200 mg by mouth       hydrochlorothiazide (HYDRODIURIL) 25 MG tablet TAKE 1 TABLET BY MOUTH ONCE DAILY FOR HIGH BLOOD PRESSURE       ketorolac (ACULAR) 0.5 % ophthalmic solution Place 1 drop into the right eye 4 times daily 5 mL 0     KLOR-CON 20 MEQ CR tablet Take 20 mEq by mouth       loratadine (CLARITIN) 10 MG tablet Take 10 mg by mouth       multivitamin w/minerals (MULTI-VITAMIN) tablet Take 1 tablet by mouth daily       omeprazole (PRILOSEC) 20 MG DR capsule Take 20 mg by mouth daily       tamsulosin (FLOMAX) 0.4 MG capsule Take 1 capsule by mouth daily         No Known Allergies    Family History   Problem Relation Age of Onset     Glaucoma No family hx of      Macular Degeneration No family hx of        Additional medical/Social/Surgical histories reviewed in [facility] and updated as appropriate.       PHYSICAL EXAM  There were no vitals taken for this visit.    General  - normal appearance, in no obvious distress  Musculoskeletal - bilateral knee  - stance: normal gait without limp  - inspection: no swelling or effusion  - palpation: no joint line tenderness, patellar tendon non-tender, tender medial patellar facet  - ROM: 115 degrees flexion, -5 degrees extension, not painful, crepitus with weight-bearing flexion  - strength: 5/5 in flexion, 5/5 in  extension  - special tests:  (-) Lachman  (-) anterior drawer  (-) Adolph  (-) Thessaly  (-) varus at 0 and 30 degrees flexion  (-) valgus at 0 and 30 degrees flexion  (+) patellar compression  (+) patellar grind  (-) patellar apprehension  Neuro  - no sensory or motor deficit, grossly normal coordination, normal muscle tone    IMAGING : XR knee bilateral 3 views August 9, 2024. Final results and radiologist's interpretation, available in the [facility] health record. Images were reviewed with the patient/family members in the office today. My personal interpretation of the performed imaging is severe osteoarthritis of bilateral patellofemoral joints.    MR knee left without contrast independently interpreted with large high grade IV cartilage loss at patellofemoral compartments.       ASSESSMENT & PLAN  Mr. Hickey is a 75 year old year old male who presents to clinic today with chronic bilateral knee pain with exacerbation secondary to known bilateral knee osteoarthritis, severe at patellofemoral joints.    Diagnosis: Primary osteoarthritis of bilateral knees, severe    Typical treatment options for osteoarthritis as well as progressions discussed today.  I reviewed what he has tried so far including physical therapy, bracing as well as oral analgesics over-the-counter with no relief.  We discussed consideration for injections such as corticosteroid as well as hyaluronic acid.  Using shared decision making we opted for corticosteroid injections today and can consider HA in the future.  He should continue to use his cane for stability and ambulation especially when leaving home.  Additionally we discussed surgical interventions such as total knee arthroplasty and he would like to avoid pursuing TKA in the near future if able.     Avoid NSAIDs due to GERD on famotidine and omeprazole.  Tylenol arthritis strength 1,000mg up to TID.   I also discussed continuing voltaren gel.     I would like to see Jossue back in the next 3  to 6 months as needed we can consider repeating these every 3 to 6 months if providing at least 3 months of relief.    [facility] interpretor utilized for duration of this visit.     PROCEDURE    Bilateral Knee Injection - Intraarticular  The patient was informed of the risks and the benefits of the procedure and a written consent was signed.  The patient s left knee was prepped with chlorhexidine in sterile fashion.   40 mg of triamcinolone suspension was drawn up into a 5 mL syringe with 4 mL of 1% lidocaine.  Injection was performed using substerile technique.  A 1.5-inch 22-gauge needle was used to enter the anterolateral aspect of the left knee.  Injection performed successfully without difficulty.  There were no complications. The patient tolerated the procedure well. There was negligible bleeding.   This procedure as above was repeated for the right knee.  The patient s right knee was prepped with chlorhexidine in sterile fashion. Injection was performed using substerile technique.  A 1.5-inch 22-gauge needle was used to enter the anterolateral aspect of the right knee.  Injection performed successfully without difficulty.  There were no complications. The patient tolerated the procedure well. There was negligible bleeding.   The patient was instructed to ice the knee upon leaving clinic and refrain from overuse over the next 3 days.   The patient was instructed to call or go to the emergency room with any unusual pain, swelling, redness, or if otherwise concerned.  A follow up appointment will be scheduled to evaluate response to the injection, and to assess range of motion and pain.     Large Joint Injection: bilateral knee    Date/Time: 5/13/2025 9:01 AM    Performed by: Elfego Herron DO  Authorized by: Elfego Herron DO    Indications:  Pain and osteoarthritis  Needle Size comment:  23g  Guidance: landmark guided    Approach:  Anterolateral  Location:  Knee  Laterality:  Bilateral      Medications (Right):   40 mg triamcinolone 40 MG/ML; 4 mL lidocaine (PF) 1 %  Medications (Left):  40 mg triamcinolone 40 MG/ML; 4 mL lidocaine (PF) 1 %  Outcome:  Tolerated well, no immediate complications  Procedure discussed: discussed risks, benefits, and alternatives    Consent Given by:  Patient  Timeout: timeout called immediately prior to procedure    Prep: patient was prepped and draped in usual sterile fashion          It was a pleasure seeing Jossue today.    Elfego Herron DO, [facility]    Primary Care Sports Medicine  Department of Orthopedic Surgery  [facility]      Again, thank you for allowing me to participate in the care of your patient.      Sincerely,    Elfego Herron DO

## 2025-05-13 NOTE — PROGRESS NOTES
CHIEF COMPLAINT:  Consult (Bilateral knees)     HISTORY OF PRESENT ILLNESS  Mr. Hickey is a pleasant 75 year old year old male who presents to clinic today with bilateral knee pain.  Jossue explains that he had insidious onset of bilateral knee pain several years ago without injury.     He was initially seen and evaluated in 2024 for his knees were x-rays were performed in August 2024.  He has been prescribed physical therapy with no relief.  He has tried bracing with no relief.  He ambulates with a cane which he finds quite helpful.  Is also no relief with OTC analgesics including Tylenol and Voltaren gel.  He was referred on by his PCP for ongoing pain despite a number of treatment measures above.    He has swelling of the knees.  Pain with walking, stairs.  Improved with rest.    Onset: gradual  Location: bilateral knee  Quality:  aching, dull, and sharp  Duration: Several years  Severity: 10/10 at worst  Timing:constant  Modifying factors:  resting and non-use makes it better, movement and use makes it worse  Associated signs & symptoms: pain  Previous similar pain: Yes  Treatments to date: Medication    Additional history: as documented    Review of Systems:  Have you recently had a a fever, chills, weight loss? No  Do you have any vision problems? No  Do you have any chest pain or edema? No  Do you have any shortness of breath or wheezing?  No  Do you have stomach problems? No  Do you have any numbness or focal weakness? No  Do you have diabetes? No  Do you have problems with bleeding or clotting? No  Do you have an rashes or other skin lesions? No    MEDICAL HISTORY  Patient Active Problem List   Diagnosis    Chronic bilateral low back pain with bilateral sciatica    Combined form of age-related cataract, both eyes    Patellofemoral arthralgia of both knees       Current Outpatient Medications   Medication Sig Dispense Refill    ACETAMINOPHEN EXTRA STRENGTH 500 MG tablet TAKE 2 TABLETS BY MOUTH EVERY 6 HOURS AS  NEEDED FOR PAIN.      allopurinol (ZYLOPRIM) 100 MG tablet       atorvastatin (LIPITOR) 80 MG tablet Take 80 mg by mouth daily      atovaquone-proguanil (MALARONE) 250-100 MG tablet Take 1 tablet by mouth daily      diclofenac (VOLTAREN) 1 % topical gel Apply 2 g topically      famotidine (PEPCID) 20 MG tablet Take 20 mg by mouth      gabapentin (NEURONTIN) 100 MG capsule Take 200 mg by mouth      hydrochlorothiazide (HYDRODIURIL) 25 MG tablet TAKE 1 TABLET BY MOUTH ONCE DAILY FOR HIGH BLOOD PRESSURE      ketorolac (ACULAR) 0.5 % ophthalmic solution Place 1 drop into the right eye 4 times daily 5 mL 0    KLOR-CON 20 MEQ CR tablet Take 20 mEq by mouth      loratadine (CLARITIN) 10 MG tablet Take 10 mg by mouth      multivitamin w/minerals (MULTI-VITAMIN) tablet Take 1 tablet by mouth daily      omeprazole (PRILOSEC) 20 MG DR capsule Take 20 mg by mouth daily      tamsulosin (FLOMAX) 0.4 MG capsule Take 1 capsule by mouth daily         No Known Allergies    Family History   Problem Relation Age of Onset    Glaucoma No family hx of     Macular Degeneration No family hx of        Additional medical/Social/Surgical histories reviewed in [facility] and updated as appropriate.       PHYSICAL EXAM  There were no vitals taken for this visit.    General  - normal appearance, in no obvious distress  Musculoskeletal - bilateral knee  - stance: normal gait without limp  - inspection: no swelling or effusion  - palpation: no joint line tenderness, patellar tendon non-tender, tender medial patellar facet  - ROM: 115 degrees flexion, -5 degrees extension, not painful, crepitus with weight-bearing flexion  - strength: 5/5 in flexion, 5/5 in extension  - special tests:  (-) Lachman  (-) anterior drawer  (-) Adolph  (-) Thessaly  (-) varus at 0 and 30 degrees flexion  (-) valgus at 0 and 30 degrees flexion  (+) patellar compression  (+) patellar grind  (-) patellar apprehension  Neuro  - no sensory or motor deficit, grossly normal  coordination, normal muscle tone    IMAGING : XR knee bilateral 3 views August 9, 2024. Final results and radiologist's interpretation, available in the [facility] health record. Images were reviewed with the patient/family members in the office today. My personal interpretation of the performed imaging is severe osteoarthritis of bilateral patellofemoral joints.    MR knee left without contrast independently interpreted with large high grade IV cartilage loss at patellofemoral compartments.       ASSESSMENT & PLAN  Mr. Hickey is a 75 year old year old male who presents to clinic today with chronic bilateral knee pain with exacerbation secondary to known bilateral knee osteoarthritis, severe at patellofemoral joints.    Diagnosis: Primary osteoarthritis of bilateral knees, severe    Typical treatment options for osteoarthritis as well as progressions discussed today.  I reviewed what he has tried so far including physical therapy, bracing as well as oral analgesics over-the-counter with no relief.  We discussed consideration for injections such as corticosteroid as well as hyaluronic acid.  Using shared decision making we opted for corticosteroid injections today and can consider HA in the future.  He should continue to use his cane for stability and ambulation especially when leaving home.  Additionally we discussed surgical interventions such as total knee arthroplasty and he would like to avoid pursuing TKA in the near future if able.     Avoid NSAIDs due to GERD on famotidine and omeprazole.  Tylenol arthritis strength 1,000mg up to TID.   I also discussed continuing voltaren gel.     I would like to see Jossue back in the next 3 to 6 months as needed we can consider repeating these every 3 to 6 months if providing at least 3 months of relief.    [facility] interpretor utilized for duration of this visit.     PROCEDURE    Bilateral Knee Injection - Intraarticular  The patient was informed of the risks and the benefits of  the procedure and a written consent was signed.  The patient s left knee was prepped with chlorhexidine in sterile fashion.   40 mg of triamcinolone suspension was drawn up into a 5 mL syringe with 4 mL of 1% lidocaine.  Injection was performed using substerile technique.  A 1.5-inch 22-gauge needle was used to enter the anterolateral aspect of the left knee.  Injection performed successfully without difficulty.  There were no complications. The patient tolerated the procedure well. There was negligible bleeding.   This procedure as above was repeated for the right knee.  The patient s right knee was prepped with chlorhexidine in sterile fashion. Injection was performed using substerile technique.  A 1.5-inch 22-gauge needle was used to enter the anterolateral aspect of the right knee.  Injection performed successfully without difficulty.  There were no complications. The patient tolerated the procedure well. There was negligible bleeding.   The patient was instructed to ice the knee upon leaving clinic and refrain from overuse over the next 3 days.   The patient was instructed to call or go to the emergency room with any unusual pain, swelling, redness, or if otherwise concerned.  A follow up appointment will be scheduled to evaluate response to the injection, and to assess range of motion and pain.     Large Joint Injection: bilateral knee    Date/Time: 5/13/2025 9:01 AM    Performed by: Elfego Herron DO  Authorized by: Elfego Herron DO    Indications:  Pain and osteoarthritis  Needle Size comment:  23g  Guidance: landmark guided    Approach:  Anterolateral  Location:  Knee  Laterality:  Bilateral      Medications (Right):  40 mg triamcinolone 40 MG/ML; 4 mL lidocaine (PF) 1 %  Medications (Left):  40 mg triamcinolone 40 MG/ML; 4 mL lidocaine (PF) 1 %  Outcome:  Tolerated well, no immediate complications  Procedure discussed: discussed risks, benefits, and alternatives    Consent Given by:  Patient  Timeout:  timeout called immediately prior to procedure    Prep: patient was prepped and draped in usual sterile fashion          It was a pleasure seeing Jossue today.    Elfego Herron DO, CAQSM    Primary Care Sports Medicine  Department of Orthopedic Surgery  [facility]

## 2025-05-13 NOTE — NURSING NOTE
[address] [address]  Dept: [phone]  ______________________________________________________________________________    Patient: Jossue Hickey   : 1950   MRN: [id number]   May 13, 2025    INVASIVE PROCEDURE SAFETY CHECKLIST    Date: 25   Procedure: Bilateral knee kenalog injections  Patient Name: Jossue Hickey  MRN: [id number]  YOB: 1950    Action: Complete sections as appropriate. Any discrepancy results in a HARD COPY until resolved.     PRE PROCEDURE:  Patient ID verified with 2 identifiers (name and  or MRN): Yes  Procedure and site verified with patient/designee (when able): Yes  Accurate consent documentation in medical record: Yes  H&P (or appropriate assessment) documented in medical record: Yes  H&P must be up to 20 days prior to procedure and updates within 24 hours of procedure as applicable: NA  Relevant diagnostic and radiology test results appropriately labeled and displayed as applicable: Yes  Procedure site(s) marked with provider initials: NA    TIMEOUT:  Time-Out performed immediately prior to starting procedure, including verbal and active participation of all team members addressing the following:Yes  * Correct patient identify  * Confirmed that the correct side and site are marked  * An accurate procedure consent form  * Agreement on the procedure to be done  * Correct patient position  * Relevant images and results are properly labeled and appropriately displayed  * The need to administer antibiotics or fluids for irrigation purposes during the procedure as applicable   * Safety precautions based on patient history or medication use    DURING PROCEDURE: Verification of correct person, site, and procedures any time the responsibility for care of the patient is transferred to another member of the care team.       Prior to injection, verified patient identity using patient's name and date of  birth.  Due to injection administration, patient instructed to remain in clinic for 15 minutes  afterwards, and to report any adverse reaction to me immediately.    Joint injection was performed.      Drug Amount Wasted:  Yes: 2 mg/ml  lidocaine  Vial/Syringe: Single dose vial  Expiration Date:  10/2028      Summer Morales, [facility]  May 13, 2025

## 2025-05-27 ENCOUNTER — OFFICE VISIT (OUTPATIENT)
Dept: AUDIOLOGY | Facility: CLINIC | Age: 75
End: 2025-05-27
Payer: COMMERCIAL

## 2025-05-27 ENCOUNTER — OFFICE VISIT (OUTPATIENT)
Dept: OTOLARYNGOLOGY | Facility: CLINIC | Age: 75
End: 2025-05-27
Payer: COMMERCIAL

## 2025-05-27 VITALS
BODY MASS INDEX: 30.32 KG/M2 | OXYGEN SATURATION: 97 % | HEIGHT: 71 IN | SYSTOLIC BLOOD PRESSURE: 152 MMHG | WEIGHT: 216.6 LBS | HEART RATE: 94 BPM | DIASTOLIC BLOOD PRESSURE: 81 MMHG

## 2025-05-27 DIAGNOSIS — H90.3 BILATERAL SENSORINEURAL HEARING LOSS: Primary | ICD-10-CM

## 2025-05-27 DIAGNOSIS — H81.10 BENIGN PAROXYSMAL POSITIONAL VERTIGO, UNSPECIFIED LATERALITY: ICD-10-CM

## 2025-05-27 PROCEDURE — 99203 OFFICE O/P NEW LOW 30 MIN: CPT | Performed by: REGISTERED NURSE

## 2025-05-27 PROCEDURE — 3077F SYST BP >= 140 MM HG: CPT | Performed by: REGISTERED NURSE

## 2025-05-27 PROCEDURE — 1126F AMNT PAIN NOTED NONE PRSNT: CPT | Performed by: REGISTERED NURSE

## 2025-05-27 PROCEDURE — 3079F DIAST BP 80-89 MM HG: CPT | Performed by: REGISTERED NURSE

## 2025-05-27 ASSESSMENT — PAIN SCALES - GENERAL: PAINLEVEL_OUTOF10: NO PAIN (0)

## 2025-05-27 NOTE — PROGRESS NOTES
Otolaryngology Clinic  May 27, 2025    Chief Complaint:   Hearing loss       History of Present Illness:   Jossue Hickey is a 75 year old male who presents today for medical clearance for hearing aids.  Patient is accompanied by an in person [facility] .  Patient reports a longstanding, gradual history of bilateral hearing loss.  Does report that 1 year is worse than the other but cannot remember which ear.  Previous audiogram has demonstrated moderately severe sensorineural hearing loss bilaterally.  Excellent word recognition scores bilaterally.  Patient has had a hearing aid consult and is scheduled for a hearing aid fitting after this clinic visit today. Patient denies any otalgia, otorrhea,  facial numbness/weakness, history of frequent ear infections, or ear surgeries.  Patient has a history of dizziness when lying back and turning over to their left side.  Patient has not received any physical therapy or further evaluation regarding the symptoms.    Past Medical History:  Past Medical History:   Diagnosis Date    Hypercholesterolemia     Hypertension      Past Surgical History:  Past Surgical History:   Procedure Laterality Date    PHACOEMULSIFICATION CLEAR CORNEA WITH STANDARD INTRAOCULAR LENS IMPLANT Right 10/26/2023    Procedure: RIGHT EYE PHACOEMULSIFICATION, CATARACT, WITH INTRAOCULAR LENS IMPLANT;  Surgeon: Kimmy Griffin MD;  Location: [facility] OR    PHACOEMULSIFICATION CLEAR CORNEA WITH STANDARD INTRAOCULAR LENS IMPLANT Left 11/9/2023    Procedure: LEFT EYE PHACOEMULSIFICATION, CATARACT, WITH INTRAOCULAR LENS IMPLANT;  Surgeon: Kimmy Griffin MD;  Location: [facility] OR       Medications:  Current Outpatient Medications   Medication Sig Dispense Refill    ACETAMINOPHEN EXTRA STRENGTH 500 MG tablet TAKE 2 TABLETS BY MOUTH EVERY 6 HOURS AS NEEDED FOR PAIN.      allopurinol (ZYLOPRIM) 100 MG tablet       atorvastatin (LIPITOR) 80 MG tablet Take 80 mg by mouth daily      atovaquone-proguanil (MALARONE)  "250-100 MG tablet Take 1 tablet by mouth daily      diclofenac (VOLTAREN) 1 % topical gel Apply 2 g topically      famotidine (PEPCID) 20 MG tablet Take 20 mg by mouth      gabapentin (NEURONTIN) 100 MG capsule Take 200 mg by mouth      hydrochlorothiazide (HYDRODIURIL) 25 MG tablet TAKE 1 TABLET BY MOUTH ONCE DAILY FOR HIGH BLOOD PRESSURE      ketorolac (ACULAR) 0.5 % ophthalmic solution Place 1 drop into the right eye 4 times daily 5 mL 0    KLOR-CON 20 MEQ CR tablet Take 20 mEq by mouth      loratadine (CLARITIN) 10 MG tablet Take 10 mg by mouth      multivitamin w/minerals (MULTI-VITAMIN) tablet Take 1 tablet by mouth daily      omeprazole (PRILOSEC) 20 MG DR capsule Take 20 mg by mouth daily      tamsulosin (FLOMAX) 0.4 MG capsule Take 1 capsule by mouth daily         Allergies:  No Known Allergies     Social History:  Social History     Tobacco Use    Smoking status: Never    Smokeless tobacco: Never       ROS: 10 point ROS neg other than the symptoms noted above in the HPI.    Physical Exam:    BP (!) 152/81   Pulse 94   Ht 1.803 m (5' 11\")   Wt 98.2 kg (216 lb 9.6 oz)   SpO2 97%   BMI 30.21 kg/m       Constitutional:  The patient was unaccompanied, well-groomed, and in no acute distress.     Skin: Normal:  warm and pink without rash    Neurologic: Alert and oriented x 3.  CN's III-XII within normal limits.  Voice normal.    Psychiatric: The patient's affect was calm, cooperative, and appropriate.     Communication:  Normal; communicates verbally, normal voice quality.    Respiratory: Breathing comfortably without stridor or exertion of accessory muscles.    Ears: Pinnae and tragus non-tender.  EAC's and TM's were clear.       Audiogram: 3/7/2025 - data independently reviewed  Right ear: Mild sloping to severe sensorineural hearing loss  Left ear: Mild sloping to moderately severe sensorineural hearing loss  WR right: 100% left: 92% at 90 dB  Acoustic Reflexes: Present in all conditions  Tympanograms: " type A bilaterally    Assessment and Plan:  1. Bilateral sensorineural hearing loss (Primary)  Patient with bilateral sensorineural hearing loss. Reviewed audiogram with patient today. Patient is a hearing aid candidate bilaterally. Patient will proceed with hearing aid fitting today. Patient is medically cleared for hearing aids.    2. Benign paroxysmal positional vertigo, unspecified laterality  Symptoms of vertigo with position changes consistent with BPPV. Recommend PT evaluation with possible eply maneuver. Referral to PT placed today.    Follow up with ENT as needed.    Veda Humphrey DNP, APRN, CNP  Otolaryngology  Head & Neck Surgery  [phone]    30 minutes spent by me on the date of the encounter doing chart review, history and exam, documentation and further activities per the note

## 2025-05-27 NOTE — PROGRESS NOTES
AUDIOLOGY REPORT    SUBJECTIVE:   Jossue Hickey is a 75 year old male who was seen in the Audiology Clinic at the [facility] for a fitting of binaural amplification. Previous results have revealed a mild to moderately-severe sensorineural hearing loss in the left ear and a mild to severe sensorineural hearing loss in the right. The patient was given medical clearance to pursue amplification by Janis Humphrey NP. He was accommpanied by a Filipino  ID: [id number]   OBJECTIVE:   The hearing aid conformity evaluation was completed.The hearing aids were placed and they provided a good fit. Real-ear-probe-microphone measurements were completed on the Drippler system and were a good match to NAL-NL1 target with soft sounds audible, moderate sounds comfortable, and loud sounds below discomfort. UCLs are verified through maximum power output measures and demonstrate appropriate limiting of loud inputs. Jossue was oriented to proper hearing aid use, care, cleaning (no water, dry brush), batteries (size: BATTERY SIZE: rechargeable, insertion/removal, toxicity, low-battery signal), aid insertion/removal, user booklet, warranty information, storage cases, and other hearing aid details. The patient confirmed understanding of hearing aid use and care, and showed proper insertion of hearing aid and batteries while in the office today.Jossue reported good volume and sound quality today.   EAR(S) FIT: Bilateral  HEARING AID MODEL NAME: Oticon Real 2 miniRITE  HEARING AID STYLE: -in-the-ear behind-the-ear  EARMOLDS/TIP: 8 mm closed  SERIAL NUMBERS: Right: [id number] Left: [id number]  WARRANTY END DATE: 6/18/2028   ASSESSMENT:   Oticon Real 2 miniRITE hearing aid(s) were fit today. Verification measures were performed. Jossue signed the Hearing Aid Purchase Agreement and was given a copy, as well as details on his hearing aids. Patient was counseled that exact out of pocket amounts  cannot be determined for hearing aid claims being sent to insurance. Any insurance coverage information presented to the patient is an estimate only, and is not a guarantee of payment. Patient has been advised to check with their own insurance.    PLAN:  Jossue will return for follow-up in 2-3 weeks for a hearing aid review appointment. Please call this clinic with questions regarding today s appointment.    Harley Aj, [facility] [place]  Licensed Audiologist  [place] License #[id number]

## 2025-05-27 NOTE — LETTER
5/27/2025       RE: Jossue Hickey  [address]  [facility] [address]     Dear Colleague,    Thank you for referring your patient, Jossue Hickey, to the [facility] EAR NOSE [facility] THROAT [facility]INIC [place] [facility]. Please see a copy of my visit note below.      Otolaryngology Clinic  May 27, 2025    Chief Complaint:   Hearing loss       History of Present Illness:   Jossue Hickey is a 75 year old male who presents today for medical clearance for hearing aids.  Patient is accompanied by an in person [facility] .  Patient reports a longstanding, gradual history of bilateral hearing loss.  Does report that 1 year is worse than the other but cannot remember which ear.  Previous audiogram has demonstrated moderately severe sensorineural hearing loss bilaterally.  Excellent word recognition scores bilaterally.  Patient has had a hearing aid consult and is scheduled for a hearing aid fitting after this clinic visit today. Patient denies any otalgia, otorrhea,  facial numbness/weakness, history of frequent ear infections, or ear surgeries.  Patient has a history of dizziness when lying back and turning over to their left side.  Patient has not received any physical therapy or further evaluation regarding the symptoms.    Past Medical History:  Past Medical History:   Diagnosis Date     Hypercholesterolemia      Hypertension      Past Surgical History:  Past Surgical History:   Procedure Laterality Date     PHACOEMULSIFICATION CLEAR CORNEA WITH STANDARD INTRAOCULAR LENS IMPLANT Right 10/26/2023    Procedure: RIGHT EYE PHACOEMULSIFICATION, CATARACT, WITH INTRAOCULAR LENS IMPLANT;  Surgeon: Kimmy Griffin MD;  Location: [facility] OR     PHACOEMULSIFICATION CLEAR CORNEA WITH STANDARD INTRAOCULAR LENS IMPLANT Left 11/9/2023    Procedure: LEFT EYE PHACOEMULSIFICATION, CATARACT, WITH INTRAOCULAR LENS IMPLANT;  Surgeon: Kimmy Griffin MD;  Location: [facility]  "OR       Medications:  Current Outpatient Medications   Medication Sig Dispense Refill     ACETAMINOPHEN EXTRA STRENGTH 500 MG tablet TAKE 2 TABLETS BY MOUTH EVERY 6 HOURS AS NEEDED FOR PAIN.       allopurinol (ZYLOPRIM) 100 MG tablet        atorvastatin (LIPITOR) 80 MG tablet Take 80 mg by mouth daily       atovaquone-proguanil (MALARONE) 250-100 MG tablet Take 1 tablet by mouth daily       diclofenac (VOLTAREN) 1 % topical gel Apply 2 g topically       famotidine (PEPCID) 20 MG tablet Take 20 mg by mouth       gabapentin (NEURONTIN) 100 MG capsule Take 200 mg by mouth       hydrochlorothiazide (HYDRODIURIL) 25 MG tablet TAKE 1 TABLET BY MOUTH ONCE DAILY FOR HIGH BLOOD PRESSURE       ketorolac (ACULAR) 0.5 % ophthalmic solution Place 1 drop into the right eye 4 times daily 5 mL 0     KLOR-CON 20 MEQ CR tablet Take 20 mEq by mouth       loratadine (CLARITIN) 10 MG tablet Take 10 mg by mouth       multivitamin w/minerals (MULTI-VITAMIN) tablet Take 1 tablet by mouth daily       omeprazole (PRILOSEC) 20 MG DR capsule Take 20 mg by mouth daily       tamsulosin (FLOMAX) 0.4 MG capsule Take 1 capsule by mouth daily         Allergies:  No Known Allergies     Social History:  Social History     Tobacco Use     Smoking status: Never     Smokeless tobacco: Never       ROS: 10 point ROS neg other than the symptoms noted above in the HPI.    Physical Exam:    BP (!) 152/81   Pulse 94   Ht 1.803 m (5' 11\")   Wt 98.2 kg (216 lb 9.6 oz)   SpO2 97%   BMI 30.21 kg/m       Constitutional:  The patient was unaccompanied, well-groomed, and in no acute distress.     Skin: Normal:  warm and pink without rash    Neurologic: Alert and oriented x 3.  CN's III-XII within normal limits.  Voice normal.    Psychiatric: The patient's affect was calm, cooperative, and appropriate.     Communication:  Normal; communicates verbally, normal voice quality.    Respiratory: Breathing comfortably without stridor or exertion of accessory muscles.  "   Ears: Pinnae and tragus non-tender.  EAC's and TM's were clear.       Audiogram: 3/7/2025 - data independently reviewed  Right ear: Mild sloping to severe sensorineural hearing loss  Left ear: Mild sloping to moderately severe sensorineural hearing loss  WR right: 100% left: 92% at 90 dB  Acoustic Reflexes: Present in all conditions  Tympanograms: type A bilaterally    Assessment and Plan:  1. Bilateral sensorineural hearing loss (Primary)  Patient with bilateral sensorineural hearing loss. Reviewed audiogram with patient today. Patient is a hearing aid candidate bilaterally. Patient will proceed with hearing aid fitting today. Patient is medically cleared for hearing aids.    2. Benign paroxysmal positional vertigo, unspecified laterality  Symptoms of vertigo with position changes consistent with BPPV. Recommend PT evaluation with possible eply maneuver. Referral to PT placed today.    Follow up with ENT as needed.    Veda Humphrey DNP, APRN, CNP  Otolaryngology  Head & Neck Surgery  [phone]    30 minutes spent by me on the date of the encounter doing chart review, history and exam, documentation and further activities per the note      Again, thank you for allowing me to participate in the care of your patient.      Sincerely,    Janis Humphrey, NP

## 2025-05-29 ENCOUNTER — DOCUMENTATION ONLY (OUTPATIENT)
Dept: AUDIOLOGY | Facility: CLINIC | Age: 75
End: 2025-05-29
Payer: COMMERCIAL

## 2025-05-29 ENCOUNTER — APPOINTMENT (OUTPATIENT)
Dept: INTERPRETER SERVICES | Facility: CLINIC | Age: 75
End: 2025-05-29
Payer: COMMERCIAL

## 2025-05-29 NOTE — PROGRESS NOTES
AUDIOLOGY REPORT  Patient walked into clinic saying his hearing aids were not working. He was fit 2 days ago. Examined hearing aids. They were turned off. Explained to patient if this happens again, put them in the  for one minute to reset them. Once I did this the hearing aids worked fine.        Butch Lennon, [facility]  Licensed Audiologist  [place] #[id number]

## 2025-06-02 ENCOUNTER — LAB REQUISITION (OUTPATIENT)
Dept: LAB | Facility: CLINIC | Age: 75
End: 2025-06-02
Payer: COMMERCIAL

## 2025-06-02 DIAGNOSIS — E78.2 MIXED HYPERLIPIDEMIA: ICD-10-CM

## 2025-06-02 DIAGNOSIS — I1A.0 RESISTANT HYPERTENSION: ICD-10-CM

## 2025-06-02 LAB
CREAT UR-MCNC: 88.7 MG/DL
MICROALBUMIN UR-MCNC: <12 MG/L
MICROALBUMIN/CREAT UR: NORMAL MG/G{CREAT}

## 2025-06-02 PROCEDURE — 80048 BASIC METABOLIC PNL TOTAL CA: CPT | Mod: ORL | Performed by: INTERNAL MEDICINE

## 2025-06-02 PROCEDURE — 80061 LIPID PANEL: CPT | Mod: ORL | Performed by: INTERNAL MEDICINE

## 2025-06-02 PROCEDURE — 82043 UR ALBUMIN QUANTITATIVE: CPT | Mod: ORL | Performed by: INTERNAL MEDICINE

## 2025-06-02 PROCEDURE — 84550 ASSAY OF BLOOD/URIC ACID: CPT | Mod: ORL | Performed by: INTERNAL MEDICINE

## 2025-06-03 LAB
ANION GAP SERPL CALCULATED.3IONS-SCNC: 16 MMOL/L (ref 7–15)
BUN SERPL-MCNC: 20.4 MG/DL (ref 8–23)
CALCIUM SERPL-MCNC: 9.6 MG/DL (ref 8.8–10.4)
CHLORIDE SERPL-SCNC: 102 MMOL/L (ref 98–107)
CHOLEST SERPL-MCNC: 231 MG/DL
CREAT SERPL-MCNC: 1.17 MG/DL (ref 0.67–1.17)
EGFRCR SERPLBLD CKD-EPI 2021: 66 ML/MIN/1.73M2
FASTING STATUS PATIENT QL REPORTED: YES
FASTING STATUS PATIENT QL REPORTED: YES
GLUCOSE SERPL-MCNC: 92 MG/DL (ref 70–99)
HCO3 SERPL-SCNC: 23 MMOL/L (ref 22–29)
HDLC SERPL-MCNC: 60 MG/DL
LDLC SERPL CALC-MCNC: 152 MG/DL
NONHDLC SERPL-MCNC: 171 MG/DL
POTASSIUM SERPL-SCNC: 4.2 MMOL/L (ref 3.4–5.3)
SODIUM SERPL-SCNC: 141 MMOL/L (ref 135–145)
TRIGL SERPL-MCNC: 97 MG/DL
URATE SERPL-MCNC: 6.1 MG/DL (ref 3.4–7)

## 2025-06-05 VITALS
HEART RATE: 69 BPM | SYSTOLIC BLOOD PRESSURE: 161 MMHG | HEIGHT: 66 IN | OXYGEN SATURATION: 97 % | DIASTOLIC BLOOD PRESSURE: 79 MMHG | WEIGHT: 175.05 LBS | TEMPERATURE: 97 F

## 2025-06-05 NOTE — H&P ADULT - HISTORY OF PRESENT ILLNESS
Cardiologist: Dr. Multani  Pharmacy:  Escort:      75 yr old M current smoker with PMHx of HTN, hyperlipidemia, DM II who presented to cardiologist c/o progressively worsening SILVA with moderate exertion x several months. Pt reports he cannot climb >1 flight of stairs or walk >2-3 city blocks prior to having to stop and rest.    Pt denies any chest pain, palpitations, dizziness, PND, orthopnea, LE edema, recent travel or sick contacts.      Nuclear Stress Test 5/3/25 revealed a medium size reversible perfusion defect of moderate intensity involing the basal-mid inferior/inferolateral myocardial walls suggestive of inducible ischemia in the posterior coronary circulation distribution. EF:75%.      In light of patient's risk factors, CCS Angina Class III equivalent symptoms and abnormal NST patient now presents for recommended cardiac catheterization with possible intervention. Cardiologist: Dr. Multani  Pharmacy: [facility]  Escort: Son    75 yr old M current smoker with PMHx of HTN, hyperlipidemia, DM II who presented to cardiologist c/o progressively worsening SILVA with moderate exertion x several months. Pt reports he cannot climb >1 flight of stairs or walk >2-3 city blocks prior to having to stop and rest.    Pt denies any chest pain, palpitations, dizziness, PND, orthopnea, LE edema, recent travel or sick contacts.      Nuclear Stress Test 5/3/25 revealed a medium size reversible perfusion defect of moderate intensity involing the basal-mid inferior/inferolateral myocardial walls suggestive of inducible ischemia in the posterior coronary circulation distribution. EF:75%.    In light of patient's risk factors, CCS Angina Class III equivalent symptoms and abnormal NST patient now presents for recommended cardiac catheterization with possible intervention.

## 2025-06-05 NOTE — H&P ADULT - ASSESSMENT
75 yr old M current smoker with PMHx of HTN, hyperlipidemia, DM II who presents for cardiac catheterization with possible intervention if clinically indicated.    EKG: NSR with HR of 70bpm	  ASA: III  Mallampati class: III   Anginal Class: III    -No Known Allergies    -H/H = 14.4/42.6  . Pt denies BRBPR, hematuria, hematochezia, melena. Pt unsure of aspirin compliance. Pt loaded with ASA 325mg x1 and Plavix 600mg x1.   Euvolemic on exam. IV NS @ 250  cc bolus followed by 75 cc/hr x 2 hrs started pre procedure    Sedation Plan:   Moderate  Patient Is Suitable Candidate For Sedation?     Yes    Risks & benefits of procedure and alternative therapy have been explained to the patient including but not limited to: allergic reaction, bleeding with possible need for blood transfusion, infection, renal and vascular compromise, limb damage, arrhythmia, stroke, vessel dissection/perforation, myocardial infarction, and emergent CABG. Informed consent obtained at bedside and included in chart.    **Of note, pt refuses use of [place] , prefers to use son at bedside as .

## 2025-06-05 NOTE — H&P ADULT - NSHPLABSRESULTS_GEN_ALL_CORE
14.4   6.04  )-----------( 334      ( 11 Jun 2025 07:10 )             42.6               PT/INR - ( 11 Jun 2025 07:10 )   PT: 10.8 sec;   INR: 0.94          PTT - ( 11 Jun 2025 07:10 )  PTT:31.6 sec

## 2025-06-11 ENCOUNTER — OUTPATIENT (OUTPATIENT)
Dept: OUTPATIENT SERVICES | Facility: HOSPITAL | Age: 75
LOS: 1 days | End: 2025-06-11
Payer: COMMERCIAL

## 2025-06-11 DIAGNOSIS — Z98.890 OTHER SPECIFIED POSTPROCEDURAL STATES: Chronic | ICD-10-CM

## 2025-06-11 LAB
A1C WITH ESTIMATED AVERAGE GLUCOSE RESULT: 8.1 % — HIGH (ref 4–5.6)
ANION GAP SERPL CALC-SCNC: 11 MMOL/L — SIGNIFICANT CHANGE UP (ref 5–17)
APTT BLD: 31.6 SEC — SIGNIFICANT CHANGE UP (ref 26.1–36.8)
BASOPHILS # BLD AUTO: 0.05 K/UL — SIGNIFICANT CHANGE UP (ref 0–0.2)
BASOPHILS NFR BLD AUTO: 0.8 % — SIGNIFICANT CHANGE UP (ref 0–2)
BUN SERPL-MCNC: 24 MG/DL — HIGH (ref 7–23)
CALCIUM SERPL-MCNC: 9.4 MG/DL — SIGNIFICANT CHANGE UP (ref 8.4–10.5)
CHLORIDE SERPL-SCNC: 104 MMOL/L — SIGNIFICANT CHANGE UP (ref 96–108)
CHOLEST SERPL-MCNC: 123 MG/DL — SIGNIFICANT CHANGE UP
CK MB CFR SERPL CALC: 3.6 NG/ML — SIGNIFICANT CHANGE UP (ref 0–6.7)
CK SERPL-CCNC: SIGNIFICANT CHANGE UP (ref 30–200)
CO2 SERPL-SCNC: 24 MMOL/L — SIGNIFICANT CHANGE UP (ref 22–31)
CREAT SERPL-MCNC: 0.88 MG/DL — SIGNIFICANT CHANGE UP (ref 0.5–1.3)
EGFR: 91 ML/MIN/1.73M2 — SIGNIFICANT CHANGE UP
EGFR: 91 ML/MIN/1.73M2 — SIGNIFICANT CHANGE UP
EOSINOPHIL # BLD AUTO: 0.18 K/UL — SIGNIFICANT CHANGE UP (ref 0–0.5)
EOSINOPHIL NFR BLD AUTO: 3 % — SIGNIFICANT CHANGE UP (ref 0–6)
ESTIMATED AVERAGE GLUCOSE: 186 MG/DL — HIGH (ref 68–114)
GLUCOSE SERPL-MCNC: 141 MG/DL — HIGH (ref 70–99)
HCT VFR BLD CALC: 42.6 % — SIGNIFICANT CHANGE UP (ref 39–50)
HDLC SERPL-MCNC: 31 MG/DL — LOW
HGB BLD-MCNC: 14.4 G/DL — SIGNIFICANT CHANGE UP (ref 13–17)
IMM GRANULOCYTES NFR BLD AUTO: 0.3 % — SIGNIFICANT CHANGE UP (ref 0–0.9)
INR BLD: 0.94 — SIGNIFICANT CHANGE UP (ref 0.85–1.16)
LDLC SERPL-MCNC: 74 MG/DL — SIGNIFICANT CHANGE UP
LIPID PNL WITH DIRECT LDL SERPL: 74 MG/DL — SIGNIFICANT CHANGE UP
LYMPHOCYTES # BLD AUTO: 2.54 K/UL — SIGNIFICANT CHANGE UP (ref 1–3.3)
LYMPHOCYTES # BLD AUTO: 42.1 % — SIGNIFICANT CHANGE UP (ref 13–44)
MCHC RBC-ENTMCNC: 30.6 PG — SIGNIFICANT CHANGE UP (ref 27–34)
MCHC RBC-ENTMCNC: 33.8 G/DL — SIGNIFICANT CHANGE UP (ref 32–36)
MCV RBC AUTO: 90.6 FL — SIGNIFICANT CHANGE UP (ref 80–100)
MONOCYTES # BLD AUTO: 0.68 K/UL — SIGNIFICANT CHANGE UP (ref 0–0.9)
MONOCYTES NFR BLD AUTO: 11.3 % — SIGNIFICANT CHANGE UP (ref 2–14)
NEUTROPHILS # BLD AUTO: 2.57 K/UL — SIGNIFICANT CHANGE UP (ref 1.8–7.4)
NEUTROPHILS NFR BLD AUTO: 42.5 % — LOW (ref 43–77)
NONHDLC SERPL-MCNC: 92 MG/DL — SIGNIFICANT CHANGE UP
NRBC BLD AUTO-RTO: 0 /100 WBCS — SIGNIFICANT CHANGE UP (ref 0–0)
PLATELET # BLD AUTO: 334 K/UL — SIGNIFICANT CHANGE UP (ref 150–400)
POTASSIUM SERPL-MCNC: SIGNIFICANT CHANGE UP (ref 3.5–5.3)
POTASSIUM SERPL-SCNC: SIGNIFICANT CHANGE UP (ref 3.5–5.3)
PROTHROM AB SERPL-ACNC: 10.8 SEC — SIGNIFICANT CHANGE UP (ref 9.9–13.4)
RBC # BLD: 4.7 M/UL — SIGNIFICANT CHANGE UP (ref 4.2–5.8)
RBC # FLD: 13.5 % — SIGNIFICANT CHANGE UP (ref 10.3–14.5)
SODIUM SERPL-SCNC: 139 MMOL/L — SIGNIFICANT CHANGE UP (ref 135–145)
TRIGL SERPL-MCNC: 93 MG/DL — SIGNIFICANT CHANGE UP
WBC # BLD: 6.04 K/UL — SIGNIFICANT CHANGE UP (ref 3.8–10.5)
WBC # FLD AUTO: 6.04 K/UL — SIGNIFICANT CHANGE UP (ref 3.8–10.5)

## 2025-06-11 PROCEDURE — C1769: CPT

## 2025-06-11 PROCEDURE — 93458 L HRT ARTERY/VENTRICLE ANGIO: CPT

## 2025-06-11 PROCEDURE — 80048 BASIC METABOLIC PNL TOTAL CA: CPT

## 2025-06-11 PROCEDURE — 85025 COMPLETE CBC W/AUTO DIFF WBC: CPT

## 2025-06-11 PROCEDURE — 83735 ASSAY OF MAGNESIUM: CPT

## 2025-06-11 PROCEDURE — 93005 ELECTROCARDIOGRAM TRACING: CPT

## 2025-06-11 PROCEDURE — 93458 L HRT ARTERY/VENTRICLE ANGIO: CPT | Mod: 26

## 2025-06-11 PROCEDURE — 85730 THROMBOPLASTIN TIME PARTIAL: CPT

## 2025-06-11 PROCEDURE — 93010 ELECTROCARDIOGRAM REPORT: CPT

## 2025-06-11 PROCEDURE — C1887: CPT

## 2025-06-11 PROCEDURE — 83036 HEMOGLOBIN GLYCOSYLATED A1C: CPT

## 2025-06-11 PROCEDURE — C1894: CPT

## 2025-06-11 PROCEDURE — 82962 GLUCOSE BLOOD TEST: CPT

## 2025-06-11 PROCEDURE — 80061 LIPID PANEL: CPT

## 2025-06-11 PROCEDURE — 82553 CREATINE MB FRACTION: CPT

## 2025-06-11 PROCEDURE — 82565 ASSAY OF CREATININE: CPT

## 2025-06-11 PROCEDURE — 85610 PROTHROMBIN TIME: CPT

## 2025-06-11 PROCEDURE — 99152 MOD SED SAME PHYS/QHP 5/>YRS: CPT

## 2025-06-11 PROCEDURE — 36415 COLL VENOUS BLD VENIPUNCTURE: CPT

## 2025-06-11 PROCEDURE — 82803 BLOOD GASES ANY COMBINATION: CPT

## 2025-06-11 PROCEDURE — 82550 ASSAY OF CK (CPK): CPT

## 2025-06-11 RX ORDER — ASPIRIN 325 MG
325 TABLET ORAL ONCE
Refills: 0 | Status: COMPLETED | OUTPATIENT
Start: 2025-06-11 | End: 2025-06-11

## 2025-06-11 RX ORDER — INSULIN GLARGINE-YFGN 100 [IU]/ML
45 INJECTION, SOLUTION SUBCUTANEOUS
Refills: 0 | DISCHARGE

## 2025-06-11 RX ORDER — CLOPIDOGREL BISULFATE 75 MG/1
600 TABLET, FILM COATED ORAL ONCE
Refills: 0 | Status: COMPLETED | OUTPATIENT
Start: 2025-06-11 | End: 2025-06-11

## 2025-06-11 RX ORDER — INSULIN LISPRO 100 U/ML
22 INJECTION, SOLUTION INTRAVENOUS; SUBCUTANEOUS
Refills: 0 | DISCHARGE

## 2025-06-11 RX ORDER — INSULIN LISPRO 100 U/ML
INJECTION, SOLUTION INTRAVENOUS; SUBCUTANEOUS
Refills: 0 | Status: DISCONTINUED | OUTPATIENT
Start: 2025-06-11 | End: 2025-06-25

## 2025-06-11 RX ORDER — METOPROLOL SUCCINATE 50 MG/1
1 TABLET, EXTENDED RELEASE ORAL
Refills: 0 | DISCHARGE

## 2025-06-11 RX ORDER — INSULIN LISPRO 100 U/ML
INJECTION, SOLUTION INTRAVENOUS; SUBCUTANEOUS AT BEDTIME
Refills: 0 | Status: DISCONTINUED | OUTPATIENT
Start: 2025-06-11 | End: 2025-06-25

## 2025-06-11 RX ORDER — DEXTROSE 50 % IN WATER 50 %
12.5 SYRINGE (ML) INTRAVENOUS ONCE
Refills: 0 | Status: DISCONTINUED | OUTPATIENT
Start: 2025-06-11 | End: 2025-06-25

## 2025-06-11 RX ORDER — ALOGLIPTIN AND METFORMIN HYDROCHLORIDE 12.5; 1 MG/1; MG/1
1 TABLET, FILM COATED ORAL
Refills: 0 | DISCHARGE

## 2025-06-11 RX ORDER — DEXTROSE 50 % IN WATER 50 %
25 SYRINGE (ML) INTRAVENOUS ONCE
Refills: 0 | Status: DISCONTINUED | OUTPATIENT
Start: 2025-06-11 | End: 2025-06-25

## 2025-06-11 RX ORDER — ASPIRIN 325 MG
81 TABLET ORAL ONCE
Refills: 0 | Status: DISCONTINUED | OUTPATIENT
Start: 2025-06-11 | End: 2025-06-11

## 2025-06-11 RX ORDER — SODIUM CHLORIDE 9 G/1000ML
1000 INJECTION, SOLUTION INTRAVENOUS
Refills: 0 | Status: DISCONTINUED | OUTPATIENT
Start: 2025-06-11 | End: 2025-06-25

## 2025-06-11 RX ORDER — RAMIPRIL 2.5 MG/1
1 CAPSULE ORAL
Refills: 0 | DISCHARGE

## 2025-06-11 RX ORDER — DEXTROSE 50 % IN WATER 50 %
15 SYRINGE (ML) INTRAVENOUS ONCE
Refills: 0 | Status: DISCONTINUED | OUTPATIENT
Start: 2025-06-11 | End: 2025-06-25

## 2025-06-11 RX ORDER — AMLODIPINE BESYLATE 10 MG/1
1 TABLET ORAL
Refills: 0 | DISCHARGE

## 2025-06-11 RX ORDER — ASPIRIN 325 MG
1 TABLET ORAL
Refills: 0 | DISCHARGE

## 2025-06-11 RX ORDER — GLUCAGON 3 MG/1
1 POWDER NASAL ONCE
Refills: 0 | Status: DISCONTINUED | OUTPATIENT
Start: 2025-06-11 | End: 2025-06-25

## 2025-06-11 RX ORDER — HYDROCHLOROTHIAZIDE 50 MG/1
1 TABLET ORAL
Refills: 0 | DISCHARGE

## 2025-06-11 RX ADMIN — CLOPIDOGREL BISULFATE 600 MILLIGRAM(S): 75 TABLET, FILM COATED ORAL at 07:46

## 2025-06-11 RX ADMIN — Medication 325 MILLIGRAM(S): at 07:59

## 2025-06-11 RX ADMIN — Medication 230 MILLILITER(S): at 09:16

## 2025-06-11 RX ADMIN — Medication 75 MILLILITER(S): at 07:45

## 2025-06-11 RX ADMIN — Medication 500 MILLILITER(S): at 07:46

## 2025-06-11 RX ADMIN — Medication 75 MILLILITER(S): at 07:59

## 2025-06-11 NOTE — PROGRESS NOTE ADULT - SUBJECTIVE AND OBJECTIVE BOX
Interventional Cardiology PA Dx Cath SDA Discharge Note    Patient without complaints. Ambulated and voided without difficulties    Afebrile, VSS    PRESCRIPTIONS/HOME MEDICATIONS:  alogliptin-metformin 12.5 mg-1000 mg oral tablet: 1 tab(s) orally 2 times a day  amLODIPine 5 mg oral tablet: 1 tab(s) orally once a day  Aspirin EC 81 mg oral delayed release tablet: 1 tab(s) orally once a day  hydroCHLOROthiazide 12.5 mg oral tablet: 1 tab(s) orally once a day  insulin lispro 100 units/mL injectable solution: 22 unit(s) injectable once a day (at bedtime)  Lantus 100 units/mL subcutaneous solution: 45 unit(s) subcutaneous once a day (at bedtime)  metoprolol succinate 50 mg oral tablet, extended release: 1 tab(s) orally once a day  ramipril 10 mg oral tablet: 1 tab(s) orally once a day  simvastatin 20 mg oral tablet: 1 tab(s) orally once a day (at bedtime)    Ext: Right Radial: NO hematoma, NO bleeding, dressing; C/D/I    Pulses: intact RAD to baseline     A/P: 75M current smoker with PMHx of HTN, HLD, DM II, who presented to [facility] for cardiac catheterization with possible intervention if clinically indicated.    s/p dx cardiac cath (6/11/25): non-obstructive dz LAD. LVEDP 12 mmHg. LVEF 75% by NST (5/3/25). IC: Dr. Multani.    1. Follow-up with PMD/Cardiologist Dr. Multani 1-2 weeks.  2. Stable for discharge as per attending Dr. Multani after bed rest, pt voids, wrist stable and 30 minutes of ambulation.  3. Patient advised to hold alogliptin-metformin 12.5 mg-1000 mg PO BID x48h post-procedure d/t IV contrast use during procedure, OK to resume 6/14/25. Patient will continue All Other Home Medications.   4. Is patient a Current Smoker: Yes - patient was counseled on importance of smoking cessation.   5. Cardiac rehab not currently indicated d/t dx cath.  6. Discharge forms signed and copies in chart      Interventional Cardiology PA Dx Cath SDA Discharge Note    Patient without complaints. Ambulated and voided without difficulties    Afebrile, VSS    PRESCRIPTIONS/HOME MEDICATIONS:  alogliptin-metformin 12.5 mg-1000 mg oral tablet: 1 tab(s) orally 2 times a day  amLODIPine 5 mg oral tablet: 1 tab(s) orally once a day  Aspirin EC 81 mg oral delayed release tablet: 1 tab(s) orally once a day  hydroCHLOROthiazide 12.5 mg oral tablet: 1 tab(s) orally once a day  insulin lispro 100 units/mL injectable solution: 22 unit(s) injectable once a day (at bedtime)  Lantus 100 units/mL subcutaneous solution: 45 unit(s) subcutaneous once a day (at bedtime)  metoprolol succinate 50 mg oral tablet, extended release: 1 tab(s) orally once a day  ramipril 10 mg oral tablet: 1 tab(s) orally once a day  simvastatin 20 mg oral tablet: 1 tab(s) orally once a day (at bedtime)    Ext: Right Radial: NO hematoma, NO bleeding, dressing; C/D/I    Pulses: intact RAD to baseline     A/P: 75M current smoker with PMHx of HTN, HLD, DM II, who presented to [facility] for cardiac catheterization with possible intervention if clinically indicated.    s/p dx cardiac cath (6/11/25): non-obstructive dz LAD. LVEDP 12 mmHg. LVEF 75% by NST (5/3/25). IC: Dr. Multani.    1. Follow-up with PMD/Cardiologist Dr. Multani 1-2 weeks.  2. Stable for discharge as per attending Dr. Multani after bed rest, pt voids, wrist stable and 30 minutes of ambulation.  3. Patient advised to hold alogliptin-metformin 12.5 mg-1000 mg PO BID x48h post-procedure d/t IV contrast use during procedure, OK to resume 6/14/25. Patient will continue All Other Home Medications.   4. Is patient a Current Smoker: pt denies hx of smoking.   5. Cardiac rehab not currently indicated d/t dx cath.   6. Discharge forms signed and copies in chart      Interventional Cardiology PA Dx Cath SDA Discharge Note    Patient without complaints. Ambulated and voided without difficulties    Afebrile, VSS    PRESCRIPTIONS/HOME MEDICATIONS:  alogliptin-metformin 12.5 mg-1000 mg oral tablet: 1 tab(s) orally 2 times a day  amLODIPine 5 mg oral tablet: 1 tab(s) orally once a day  Aspirin EC 81 mg oral delayed release tablet: 1 tab(s) orally once a day  hydroCHLOROthiazide 12.5 mg oral tablet: 1 tab(s) orally once a day  insulin lispro 100 units/mL injectable solution: 22 unit(s) injectable once a day (at bedtime)  Lantus 100 units/mL subcutaneous solution: 45 unit(s) subcutaneous once a day (at bedtime)  metoprolol succinate 50 mg oral tablet, extended release: 1 tab(s) orally once a day  ramipril 10 mg oral tablet: 1 tab(s) orally once a day  simvastatin 20 mg oral tablet: 1 tab(s) orally once a day (at bedtime)    Ext: Right Radial: NO hematoma, NO bleeding, dressing; C/D/I    Pulses: intact RAD to baseline     A/P: 75M current smoker with PMHx of HTN, HLD, DM II, who presented to [facility] for cardiac catheterization with possible intervention if clinically indicated.    s/p dx cardiac cath (6/11/25): non-obstructive dz LAD. LVEDP 12 mmHg. LVEF 75% by NST (5/3/25). IC: Dr. Multani.    1. Follow-up with PMD/Cardiologist Dr. Multani 1-2 weeks.  2. Stable for discharge as per attending Dr. Multani after bed rest, pt voids, wrist stable and 30 minutes of ambulation.  3. Patient advised to hold alogliptin-Metformin 12.5 mg-1000 mg PO BID x48h post-procedure d/t IV contrast use during procedure, OK to resume 6/14/25. Patient will continue All Other Home Medications.   4. Is patient a Current Smoker: pt denies hx of smoking.   5. Cardiac rehab not currently indicated d/t dx cath.   6. Discharge forms signed and copies in chart     *K hemolyzed in CMP, VBG potassium 4.3. Not necessary to repeat CMP per d/w IC.

## 2025-06-13 DIAGNOSIS — I25.110 ATHEROSCLEROTIC HEART DISEASE OF NATIVE CORONARY ARTERY WITH UNSTABLE ANGINA PECTORIS: ICD-10-CM

## 2025-06-13 DIAGNOSIS — R94.39 ABNORMAL RESULT OF OTHER CARDIOVASCULAR FUNCTION STUDY: ICD-10-CM

## 2025-06-17 ENCOUNTER — LAB REQUISITION (OUTPATIENT)
Dept: LAB | Facility: CLINIC | Age: 75
End: 2025-06-17
Payer: COMMERCIAL

## 2025-06-17 DIAGNOSIS — Z12.11 ENCOUNTER FOR SCREENING FOR MALIGNANT NEOPLASM OF COLON: ICD-10-CM

## 2025-06-18 LAB — HEMOCCULT STL QL IA: NEGATIVE

## 2025-06-24 ENCOUNTER — TELEPHONE (OUTPATIENT)
Dept: AUDIOLOGY | Facility: CLINIC | Age: 75
End: 2025-06-24
Payer: COMMERCIAL

## 2025-06-24 NOTE — TELEPHONE ENCOUNTER
Lvm via interp to cancel hearing aid check appt on 6/26 w/ Ita Joyce. Pt was rescheduled by call center from IRP appt (which is 60 minutes w/o interp) to a 30 minute hearing aid check appt. Provider needs more time. Gave call center number to reschedule.    Per DEIDRA dejesus to offer IRP visit 30th at 2:00 w/ DEIDRA Joyce or any other HARMONY/BAHA.     OR    Patient has also seen Megha if they want one of her openings tomorrow (6/25) in the morning.    Virgie Benz on 6/24/2025 at 3:13 PM

## 2025-06-26 ENCOUNTER — DOCUMENTATION ONLY (OUTPATIENT)
Dept: AUDIOLOGY | Facility: CLINIC | Age: 75
End: 2025-06-26

## 2025-06-26 DIAGNOSIS — H90.3 BILATERAL SENSORINEURAL HEARING LOSS: Primary | ICD-10-CM

## 2025-06-27 NOTE — PROGRESS NOTES
Walk-in hearing aid services on 6/26/25: The patient was originally scheduled for an initial review appointment today but the appointment was cancelled several days ago. The patient still came to the clinic and said his hearing aids weren't working. Examination found the receivers were badly twisted out of position. It appeared the patient was likely wearing the left hearing aid in the right ear and vice versa. The hearing aids were cleaned and the receivers were replaced under warranty. A listening check found the hearing aids to be working properly and they were returned to the patient. He was then brought over to a scheduling pod so he could be appropriately scheduled for a 60 min hearing aid check appointment with an .    A clean & check charge is being billed for today's services.    Michael Pastrana  Audiology Clinic Assistant

## 2025-07-10 ENCOUNTER — OFFICE VISIT (OUTPATIENT)
Dept: AUDIOLOGY | Facility: CLINIC | Age: 75
End: 2025-07-10
Payer: COMMERCIAL

## 2025-07-10 DIAGNOSIS — H90.3 BILATERAL SENSORINEURAL HEARING LOSS: Primary | ICD-10-CM

## 2025-07-24 ENCOUNTER — LAB REQUISITION (OUTPATIENT)
Dept: LAB | Facility: CLINIC | Age: 75
End: 2025-07-24
Payer: COMMERCIAL

## 2025-07-24 DIAGNOSIS — R05.1 ACUTE COUGH: ICD-10-CM

## 2025-07-24 PROCEDURE — 87081 CULTURE SCREEN ONLY: CPT | Mod: ORL | Performed by: NURSE PRACTITIONER

## 2025-07-26 LAB — BACTERIA SPEC CULT: NORMAL

## 2025-08-01 DIAGNOSIS — R97.20 ELEVATED PROSTATE, SPECIFIC ANTIGEN [PSA]: ICD-10-CM

## 2025-08-04 ENCOUNTER — DOCUMENTATION ONLY (OUTPATIENT)
Dept: AUDIOLOGY | Facility: CLINIC | Age: 75
End: 2025-08-04
Payer: COMMERCIAL

## 2025-08-04 DIAGNOSIS — H90.3 ASYMMETRICAL SENSORINEURAL HEARING LOSS: Primary | ICD-10-CM

## 2025-08-07 ENCOUNTER — LAB REQUISITION (OUTPATIENT)
Dept: LAB | Facility: CLINIC | Age: 75
End: 2025-08-07
Payer: COMMERCIAL

## 2025-08-07 DIAGNOSIS — R60.0 LOCALIZED EDEMA: ICD-10-CM

## 2025-08-07 DIAGNOSIS — M79.671 PAIN IN RIGHT FOOT: ICD-10-CM

## 2025-08-07 DIAGNOSIS — M79.672 PAIN IN LEFT FOOT: ICD-10-CM

## 2025-08-07 PROCEDURE — 82977 ASSAY OF GGT: CPT | Mod: ORL | Performed by: INTERNAL MEDICINE

## 2025-08-07 PROCEDURE — 80076 HEPATIC FUNCTION PANEL: CPT | Mod: ORL | Performed by: INTERNAL MEDICINE

## 2025-08-07 PROCEDURE — 84550 ASSAY OF BLOOD/URIC ACID: CPT | Mod: ORL | Performed by: INTERNAL MEDICINE

## 2025-08-07 PROCEDURE — 83880 ASSAY OF NATRIURETIC PEPTIDE: CPT | Mod: ORL | Performed by: INTERNAL MEDICINE

## 2025-08-07 PROCEDURE — 84443 ASSAY THYROID STIM HORMONE: CPT | Mod: ORL | Performed by: INTERNAL MEDICINE

## 2025-08-08 LAB
ALBUMIN SERPL BCG-MCNC: 4.5 G/DL (ref 3.5–5.2)
ALP SERPL-CCNC: 68 U/L (ref 40–150)
ALT SERPL W P-5'-P-CCNC: 16 U/L (ref 0–70)
AST SERPL W P-5'-P-CCNC: 23 U/L (ref 0–45)
BILIRUB DIRECT SERPL-MCNC: 0.16 MG/DL (ref 0–0.45)
BILIRUB SERPL-MCNC: 0.4 MG/DL
GGT SERPL-CCNC: 20 U/L (ref 8–61)
NT-PROBNP SERPL-MCNC: 131 PG/ML (ref 0–229)
PROT SERPL-MCNC: 7.5 G/DL (ref 6.4–8.3)
TSH SERPL DL<=0.005 MIU/L-ACNC: 1.29 UIU/ML (ref 0.3–4.2)
URATE SERPL-MCNC: 5.8 MG/DL (ref 3.4–7)

## 2025-08-11 ENCOUNTER — APPOINTMENT (OUTPATIENT)
Dept: MRI IMAGING | Facility: CLINIC | Age: 75
End: 2025-08-11

## 2025-08-11 ENCOUNTER — RESULT REVIEW (OUTPATIENT)
Age: 75
End: 2025-08-11

## 2025-08-11 PROCEDURE — 76498P: CUSTOM

## 2025-08-11 PROCEDURE — 72197 MRI PELVIS W/O & W/DYE: CPT

## 2025-08-11 PROCEDURE — A9585: CPT

## 2025-08-13 ENCOUNTER — DOCUMENTATION ONLY (OUTPATIENT)
Dept: AUDIOLOGY | Facility: CLINIC | Age: 75
End: 2025-08-13
Payer: COMMERCIAL

## 2025-08-13 DIAGNOSIS — H90.3 BILATERAL SENSORINEURAL HEARING LOSS: Primary | ICD-10-CM

## 2025-08-27 ENCOUNTER — OFFICE VISIT (OUTPATIENT)
Dept: AUDIOLOGY | Facility: CLINIC | Age: 75
End: 2025-08-27
Payer: COMMERCIAL

## 2025-08-27 DIAGNOSIS — H90.3 BILATERAL SENSORINEURAL HEARING LOSS: Primary | ICD-10-CM

## (undated) DEVICE — FOLEY CATH 3-WAY 22FR 30CC LATEX HEMATURIA

## (undated) DEVICE — FOLEY CATH 3-WAY 20FR 30CC LATEX HEMATURIA

## (undated) DEVICE — PACK CYSTO

## (undated) DEVICE — UROVAC

## (undated) DEVICE — EYE CANN IRR 25GA CYSTOTOME 581610

## (undated) DEVICE — GLOVE BIOGEL PI MICRO SZ 7.0 48570

## (undated) DEVICE — LINEN TOWEL PACK X5 5464

## (undated) DEVICE — EYE SHIELD PLASTIC

## (undated) DEVICE — VENODYNE/SCD SLEEVE CALF MEDIUM

## (undated) DEVICE — TUBING RANGER FLUID IRRIGATION SET DISP

## (undated) DEVICE — MARKING PEN W RULER

## (undated) DEVICE — EYE KNIFE SLIT XSTAR VISITEC 2.6MM 45DEG 373726

## (undated) DEVICE — WARMING BLANKET UPPER ADULT

## (undated) DEVICE — POSITIONER FOAM EGG CRATE ULNAR 2PCS (PINK)

## (undated) DEVICE — EYE PACK CUSTOM ANTERIOR 30DEG TIP CENTURION PPK6682-04

## (undated) DEVICE — EYE TIP IRRIGATION & ASPIRATION POLYMER CVD 0.3MM 8065751512

## (undated) DEVICE — DRAPE TOWEL BLUE 17" X 24"

## (undated) DEVICE — DRAINAGE BAG URINARY 4L

## (undated) DEVICE — TAPE SILK 3"

## (undated) DEVICE — NDL HYPO SAFE 18G X 1.5" (PINK)

## (undated) DEVICE — ELCTR PLASMA BUTTON OVAL 24FR 12-30 DEG

## (undated) DEVICE — SOL WATER IRRIG 500ML BOTTLE 2F7113

## (undated) DEVICE — TUBING TUR 2 PRONG

## (undated) DEVICE — SLV COMPRESSION KNEE MED

## (undated) DEVICE — EYE HANDPIECE 23GA VITRECTOMY CENTURION 8065752134

## (undated) DEVICE — EYE KNIFE STILETTO VISITEC 1.1MM ANG 45DEG SIDEPORT 376620

## (undated) DEVICE — EYE CANN IRR 27GA ANTERIOR CHAMBER 581280

## (undated) DEVICE — PACK CATARACT CUSTOM ASC SEY15CPUMC

## (undated) DEVICE — ELCTR PLASMA LOOP MEDIUM ANGLED 24FR 12-30 DEG

## (undated) DEVICE — DRSG TELFA 3 X 8

## (undated) DEVICE — SOL IRR BAG NS 0.9% 3000ML

## (undated) DEVICE — GLV 7.5 PROTEXIS (WHITE)

## (undated) RX ORDER — LIDOCAINE HYDROCHLORIDE 10 MG/ML
INJECTION, SOLUTION EPIDURAL; INFILTRATION; INTRACAUDAL; PERINEURAL
Status: DISPENSED
Start: 2025-05-13

## (undated) RX ORDER — FENTANYL CITRATE 50 UG/ML
INJECTION, SOLUTION INTRAMUSCULAR; INTRAVENOUS
Status: DISPENSED
Start: 2023-11-09

## (undated) RX ORDER — ACETAMINOPHEN 325 MG/1
TABLET ORAL
Status: DISPENSED
Start: 2023-10-26

## (undated) RX ORDER — TRIAMCINOLONE ACETONIDE 40 MG/ML
INJECTION, SUSPENSION INTRA-ARTICULAR; INTRAMUSCULAR
Status: DISPENSED
Start: 2025-05-13

## (undated) RX ORDER — ACETAMINOPHEN 325 MG/1
TABLET ORAL
Status: DISPENSED
Start: 2023-11-09